# Patient Record
Sex: FEMALE | Race: WHITE | NOT HISPANIC OR LATINO | Employment: FULL TIME
[De-identification: names, ages, dates, MRNs, and addresses within clinical notes are randomized per-mention and may not be internally consistent; named-entity substitution may affect disease eponyms.]

---

## 2017-02-07 ENCOUNTER — RECORDS - HEALTHEAST (OUTPATIENT)
Dept: ADMINISTRATIVE | Facility: OTHER | Age: 37
End: 2017-02-07

## 2017-02-28 ENCOUNTER — OFFICE VISIT - HEALTHEAST (OUTPATIENT)
Dept: FAMILY MEDICINE | Facility: CLINIC | Age: 37
End: 2017-02-28

## 2017-02-28 DIAGNOSIS — J30.2 SEASONAL ALLERGIC RHINITIS, UNSPECIFIED ALLERGIC RHINITIS TRIGGER: ICD-10-CM

## 2017-02-28 DIAGNOSIS — L20.9 ATOPIC DERMATITIS, MILD: ICD-10-CM

## 2017-03-30 ENCOUNTER — COMMUNICATION - HEALTHEAST (OUTPATIENT)
Dept: FAMILY MEDICINE | Facility: CLINIC | Age: 37
End: 2017-03-30

## 2017-03-30 DIAGNOSIS — R21 RASH: ICD-10-CM

## 2017-04-25 ENCOUNTER — RECORDS - HEALTHEAST (OUTPATIENT)
Dept: ADMINISTRATIVE | Facility: OTHER | Age: 37
End: 2017-04-25

## 2018-01-11 ENCOUNTER — OFFICE VISIT - HEALTHEAST (OUTPATIENT)
Dept: FAMILY MEDICINE | Facility: CLINIC | Age: 38
End: 2018-01-11

## 2018-01-11 DIAGNOSIS — J01.90 ACUTE SINUSITIS, RECURRENCE NOT SPECIFIED, UNSPECIFIED LOCATION: ICD-10-CM

## 2018-02-22 ENCOUNTER — RECORDS - HEALTHEAST (OUTPATIENT)
Dept: ADMINISTRATIVE | Facility: OTHER | Age: 38
End: 2018-02-22

## 2018-06-07 ENCOUNTER — COMMUNICATION - HEALTHEAST (OUTPATIENT)
Dept: FAMILY MEDICINE | Facility: CLINIC | Age: 38
End: 2018-06-07

## 2018-06-07 ENCOUNTER — RECORDS - HEALTHEAST (OUTPATIENT)
Dept: GENERAL RADIOLOGY | Facility: CLINIC | Age: 38
End: 2018-06-07

## 2018-06-07 ENCOUNTER — OFFICE VISIT - HEALTHEAST (OUTPATIENT)
Dept: FAMILY MEDICINE | Facility: CLINIC | Age: 38
End: 2018-06-07

## 2018-06-07 DIAGNOSIS — M25.519 PAIN IN UNSPECIFIED SHOULDER: ICD-10-CM

## 2018-06-07 DIAGNOSIS — M25.512 PAIN IN LEFT SHOULDER: ICD-10-CM

## 2018-06-07 DIAGNOSIS — M25.512 LEFT SHOULDER PAIN: ICD-10-CM

## 2018-06-07 DIAGNOSIS — M25.519 AC JOINT PAIN: ICD-10-CM

## 2018-06-07 RX ORDER — LORATADINE 10 MG/1
10 TABLET ORAL DAILY
Status: SHIPPED | COMMUNITY
Start: 2018-06-07

## 2018-06-12 ENCOUNTER — COMMUNICATION - HEALTHEAST (OUTPATIENT)
Dept: FAMILY MEDICINE | Facility: CLINIC | Age: 38
End: 2018-06-12

## 2018-06-12 DIAGNOSIS — M76.60 ACHILLES TENDINITIS: ICD-10-CM

## 2018-06-12 DIAGNOSIS — M25.512 LEFT SHOULDER PAIN: ICD-10-CM

## 2018-06-14 ENCOUNTER — OFFICE VISIT - HEALTHEAST (OUTPATIENT)
Dept: PHYSICAL THERAPY | Facility: REHABILITATION | Age: 38
End: 2018-06-14

## 2018-06-14 DIAGNOSIS — R29.3 POOR POSTURE: ICD-10-CM

## 2018-06-14 DIAGNOSIS — M62.81 MUSCLE WEAKNESS (GENERALIZED): ICD-10-CM

## 2018-06-14 DIAGNOSIS — M25.512 ARTHRALGIA OF LEFT ACROMIOCLAVICULAR JOINT: ICD-10-CM

## 2018-08-31 ENCOUNTER — OFFICE VISIT - HEALTHEAST (OUTPATIENT)
Dept: FAMILY MEDICINE | Facility: CLINIC | Age: 38
End: 2018-08-31

## 2018-08-31 DIAGNOSIS — Z00.00 HEALTH MAINTENANCE EXAMINATION: ICD-10-CM

## 2018-08-31 ASSESSMENT — MIFFLIN-ST. JEOR: SCORE: 1550.18

## 2018-09-10 ENCOUNTER — AMBULATORY - HEALTHEAST (OUTPATIENT)
Dept: LAB | Facility: CLINIC | Age: 38
End: 2018-09-10

## 2018-09-10 DIAGNOSIS — Z00.00 HEALTH MAINTENANCE EXAMINATION: ICD-10-CM

## 2018-09-10 LAB
CHOLEST SERPL-MCNC: 180 MG/DL
FASTING STATUS PATIENT QL REPORTED: YES
FASTING STATUS PATIENT QL REPORTED: YES
GLUCOSE BLD-MCNC: 89 MG/DL (ref 70–99)
HDLC SERPL-MCNC: 59 MG/DL
LDLC SERPL CALC-MCNC: 107 MG/DL
TRIGL SERPL-MCNC: 72 MG/DL
TSH SERPL DL<=0.005 MIU/L-ACNC: 1.52 UIU/ML (ref 0.3–5)

## 2018-09-11 LAB
25(OH)D3 SERPL-MCNC: 27.6 NG/ML (ref 30–80)
25(OH)D3 SERPL-MCNC: 27.6 NG/ML (ref 30–80)

## 2020-07-08 ENCOUNTER — RECORDS - HEALTHEAST (OUTPATIENT)
Dept: GENERAL RADIOLOGY | Facility: CLINIC | Age: 40
End: 2020-07-08

## 2020-07-08 ENCOUNTER — OFFICE VISIT - HEALTHEAST (OUTPATIENT)
Dept: FAMILY MEDICINE | Facility: CLINIC | Age: 40
End: 2020-07-08

## 2020-07-08 DIAGNOSIS — Z97.5 PRESENCE OF (INTRAUTERINE) CONTRACEPTIVE DEVICE: ICD-10-CM

## 2020-07-08 DIAGNOSIS — M79.671 PAIN OF RIGHT HEEL: ICD-10-CM

## 2020-07-08 DIAGNOSIS — Z97.5 IUD (INTRAUTERINE DEVICE) IN PLACE: ICD-10-CM

## 2020-07-08 ASSESSMENT — MIFFLIN-ST. JEOR: SCORE: 1569.47

## 2020-11-02 ENCOUNTER — OFFICE VISIT - HEALTHEAST (OUTPATIENT)
Dept: FAMILY MEDICINE | Facility: CLINIC | Age: 40
End: 2020-11-02

## 2020-11-02 DIAGNOSIS — M70.62 TROCHANTERIC BURSITIS OF BOTH HIPS: ICD-10-CM

## 2020-11-02 DIAGNOSIS — M70.61 TROCHANTERIC BURSITIS OF BOTH HIPS: ICD-10-CM

## 2020-11-08 ENCOUNTER — RECORDS - HEALTHEAST (OUTPATIENT)
Dept: ADMINISTRATIVE | Facility: OTHER | Age: 40
End: 2020-11-08

## 2020-11-24 ENCOUNTER — OFFICE VISIT - HEALTHEAST (OUTPATIENT)
Dept: FAMILY MEDICINE | Facility: CLINIC | Age: 40
End: 2020-11-24

## 2020-11-24 DIAGNOSIS — Z12.11 SCREEN FOR COLON CANCER: ICD-10-CM

## 2020-11-24 DIAGNOSIS — R59.1 LYMPHADENOPATHY: ICD-10-CM

## 2020-12-15 ENCOUNTER — OFFICE VISIT - HEALTHEAST (OUTPATIENT)
Dept: FAMILY MEDICINE | Facility: CLINIC | Age: 40
End: 2020-12-15

## 2020-12-15 DIAGNOSIS — N63.10 MASSES OF BOTH BREASTS: ICD-10-CM

## 2020-12-15 DIAGNOSIS — R59.0 AXILLARY LYMPHADENOPATHY: ICD-10-CM

## 2020-12-15 DIAGNOSIS — N63.20 MASSES OF BOTH BREASTS: ICD-10-CM

## 2020-12-28 ENCOUNTER — HOSPITAL ENCOUNTER (OUTPATIENT)
Dept: MAMMOGRAPHY | Facility: CLINIC | Age: 40
Discharge: HOME | End: 2020-12-28
Attending: FAMILY MEDICINE

## 2020-12-28 DIAGNOSIS — N63.20 MASSES OF BOTH BREASTS: ICD-10-CM

## 2020-12-28 DIAGNOSIS — R59.0 AXILLARY LYMPHADENOPATHY: ICD-10-CM

## 2020-12-28 DIAGNOSIS — N63.10 MASSES OF BOTH BREASTS: ICD-10-CM

## 2020-12-28 DIAGNOSIS — N63.20 LEFT BREAST LUMP: ICD-10-CM

## 2021-01-24 ENCOUNTER — COMMUNICATION - HEALTHEAST (OUTPATIENT)
Dept: FAMILY MEDICINE | Facility: CLINIC | Age: 41
End: 2021-01-24

## 2021-02-05 ENCOUNTER — COMMUNICATION - HEALTHEAST (OUTPATIENT)
Dept: FAMILY MEDICINE | Facility: CLINIC | Age: 41
End: 2021-02-05

## 2021-02-05 DIAGNOSIS — M70.61 TROCHANTERIC BURSITIS OF BOTH HIPS: ICD-10-CM

## 2021-02-05 DIAGNOSIS — M70.62 TROCHANTERIC BURSITIS OF BOTH HIPS: ICD-10-CM

## 2021-02-05 RX ORDER — NAPROXEN 500 MG/1
TABLET ORAL
Qty: 60 TABLET | Refills: 2 | Status: SHIPPED | OUTPATIENT
Start: 2021-02-05 | End: 2022-03-08

## 2021-02-15 ENCOUNTER — OFFICE VISIT - HEALTHEAST (OUTPATIENT)
Dept: FAMILY MEDICINE | Facility: CLINIC | Age: 41
End: 2021-02-15

## 2021-02-15 DIAGNOSIS — Z11.59 ENCOUNTER FOR HEPATITIS C SCREENING TEST FOR LOW RISK PATIENT: ICD-10-CM

## 2021-02-15 DIAGNOSIS — M79.671 PAIN OF RIGHT HEEL: ICD-10-CM

## 2021-02-15 DIAGNOSIS — Z12.4 SCREENING FOR CERVICAL CANCER: ICD-10-CM

## 2021-02-15 DIAGNOSIS — Z80.0 FAMILY HISTORY OF COLON CANCER: ICD-10-CM

## 2021-02-15 DIAGNOSIS — Z97.5 IUD (INTRAUTERINE DEVICE) IN PLACE: ICD-10-CM

## 2021-02-15 DIAGNOSIS — Z00.01 ENCOUNTER FOR GENERAL ADULT MEDICAL EXAMINATION WITH ABNORMAL FINDINGS: ICD-10-CM

## 2021-02-15 DIAGNOSIS — Z88.9: ICD-10-CM

## 2021-02-15 DIAGNOSIS — Z13.220 LIPID SCREENING: ICD-10-CM

## 2021-02-15 DIAGNOSIS — Z80.3 FAMILY HISTORY OF MALIGNANT NEOPLASM OF BREAST: ICD-10-CM

## 2021-02-15 DIAGNOSIS — Z12.11 SCREEN FOR COLON CANCER: ICD-10-CM

## 2021-02-15 DIAGNOSIS — Z83.49 FAMILY HISTORY OF THYROID DISEASE: ICD-10-CM

## 2021-02-15 LAB
ALBUMIN SERPL-MCNC: 3.9 G/DL (ref 3.5–5)
ALP SERPL-CCNC: 58 U/L (ref 45–120)
ALT SERPL W P-5'-P-CCNC: 13 U/L (ref 0–45)
ANION GAP SERPL CALCULATED.3IONS-SCNC: 9 MMOL/L (ref 5–18)
AST SERPL W P-5'-P-CCNC: 14 U/L (ref 0–40)
BILIRUB SERPL-MCNC: 0.6 MG/DL (ref 0–1)
BUN SERPL-MCNC: 15 MG/DL (ref 8–22)
CALCIUM SERPL-MCNC: 9 MG/DL (ref 8.5–10.5)
CHLORIDE BLD-SCNC: 106 MMOL/L (ref 98–107)
CHOLEST SERPL-MCNC: 181 MG/DL
CO2 SERPL-SCNC: 23 MMOL/L (ref 22–31)
CREAT SERPL-MCNC: 0.77 MG/DL (ref 0.6–1.1)
ERYTHROCYTE [DISTWIDTH] IN BLOOD BY AUTOMATED COUNT: 12.5 % (ref 11–14.5)
FASTING STATUS PATIENT QL REPORTED: YES
GFR SERPL CREATININE-BSD FRML MDRD: >60 ML/MIN/1.73M2
GLUCOSE BLD-MCNC: 86 MG/DL (ref 70–125)
HCT VFR BLD AUTO: 43.3 % (ref 35–47)
HDLC SERPL-MCNC: 62 MG/DL
HGB BLD-MCNC: 14.6 G/DL (ref 12–16)
LDLC SERPL CALC-MCNC: 104 MG/DL
MCH RBC QN AUTO: 29.4 PG (ref 27–34)
MCHC RBC AUTO-ENTMCNC: 33.7 G/DL (ref 32–36)
MCV RBC AUTO: 87 FL (ref 80–100)
PLATELET # BLD AUTO: 276 THOU/UL (ref 140–440)
PMV BLD AUTO: 8.5 FL (ref 7–10)
POTASSIUM BLD-SCNC: 4.3 MMOL/L (ref 3.5–5)
PROT SERPL-MCNC: 6.6 G/DL (ref 6–8)
RBC # BLD AUTO: 4.96 MILL/UL (ref 3.8–5.4)
SODIUM SERPL-SCNC: 138 MMOL/L (ref 136–145)
TRIGL SERPL-MCNC: 75 MG/DL
TSH SERPL DL<=0.005 MIU/L-ACNC: 1.15 UIU/ML (ref 0.3–5)
WBC: 5.8 THOU/UL (ref 4–11)

## 2021-02-15 RX ORDER — MONTELUKAST SODIUM 10 MG/1
10 TABLET ORAL DAILY
Qty: 30 TABLET | Refills: 11 | Status: SHIPPED | OUTPATIENT
Start: 2021-02-15 | End: 2021-09-21

## 2021-02-15 ASSESSMENT — MIFFLIN-ST. JEOR: SCORE: 1589.42

## 2021-02-16 LAB
HCV AB SERPL QL IA: NEGATIVE
HPV SOURCE: NORMAL
HUMAN PAPILLOMA VIRUS 16 DNA: NEGATIVE
HUMAN PAPILLOMA VIRUS 18 DNA: NEGATIVE
HUMAN PAPILLOMA VIRUS FINAL DIAGNOSIS: NORMAL
HUMAN PAPILLOMA VIRUS OTHER HR: NEGATIVE
SPECIMEN DESCRIPTION: NORMAL

## 2021-02-22 LAB
BKR LAB AP ABNORMAL BLEEDING: NO
BKR LAB AP BIRTH CONTROL/HORMONES: NORMAL
BKR LAB AP CERVICAL APPEARANCE: NORMAL
BKR LAB AP GYN ADEQUACY: NORMAL
BKR LAB AP GYN INTERPRETATION: NORMAL
BKR LAB AP HPV REFLEX: NORMAL
BKR LAB AP LMP: NORMAL
BKR LAB AP PATIENT STATUS: NORMAL
BKR LAB AP PREVIOUS ABNORMAL: NORMAL
BKR LAB AP PREVIOUS NORMAL: NORMAL
HIGH RISK?: YES
PATH REPORT.COMMENTS IMP SPEC: NORMAL
RESULT FLAG (HE HISTORICAL CONVERSION): NORMAL

## 2021-03-16 ENCOUNTER — OFFICE VISIT - HEALTHEAST (OUTPATIENT)
Dept: ALLERGY | Facility: CLINIC | Age: 41
End: 2021-03-16

## 2021-03-16 DIAGNOSIS — T78.2XXD ANAPHYLAXIS, SUBSEQUENT ENCOUNTER: ICD-10-CM

## 2021-03-16 DIAGNOSIS — L50.1 CHRONIC IDIOPATHIC URTICARIA: ICD-10-CM

## 2021-03-16 DIAGNOSIS — J30.1 SEASONAL ALLERGIC RHINITIS DUE TO POLLEN: ICD-10-CM

## 2021-03-16 DIAGNOSIS — L50.3 DERMATOGRAPHIA: ICD-10-CM

## 2021-03-16 LAB
ALT SERPL W P-5'-P-CCNC: 14 U/L (ref 0–45)
AST SERPL W P-5'-P-CCNC: 14 U/L (ref 0–40)
BASOPHILS # BLD AUTO: 0 THOU/UL (ref 0–0.2)
BASOPHILS NFR BLD AUTO: 0 % (ref 0–2)
CREAT SERPL-MCNC: 0.77 MG/DL (ref 0.6–1.1)
EOSINOPHIL # BLD AUTO: 0.3 THOU/UL (ref 0–0.4)
EOSINOPHIL NFR BLD AUTO: 3 % (ref 0–6)
ERYTHROCYTE [DISTWIDTH] IN BLOOD BY AUTOMATED COUNT: 13 % (ref 11–14.5)
GFR SERPL CREATININE-BSD FRML MDRD: >60 ML/MIN/1.73M2
HCT VFR BLD AUTO: 41.7 % (ref 35–47)
HGB BLD-MCNC: 14.1 G/DL (ref 12–16)
IMM GRANULOCYTES # BLD: 0 THOU/UL
IMM GRANULOCYTES NFR BLD: 0 %
LYMPHOCYTES # BLD AUTO: 2.7 THOU/UL (ref 0.8–4.4)
LYMPHOCYTES NFR BLD AUTO: 29 % (ref 20–40)
MCH RBC QN AUTO: 29.4 PG (ref 27–34)
MCHC RBC AUTO-ENTMCNC: 33.8 G/DL (ref 32–36)
MCV RBC AUTO: 87 FL (ref 80–100)
MONOCYTES # BLD AUTO: 0.7 THOU/UL (ref 0–0.9)
MONOCYTES NFR BLD AUTO: 7 % (ref 2–10)
NEUTROPHILS # BLD AUTO: 5.4 THOU/UL (ref 2–7.7)
NEUTROPHILS NFR BLD AUTO: 60 % (ref 50–70)
PLATELET # BLD AUTO: 305 THOU/UL (ref 140–440)
PMV BLD AUTO: 8.7 FL (ref 7–10)
RBC # BLD AUTO: 4.8 MILL/UL (ref 3.8–5.4)
TSH SERPL DL<=0.005 MIU/L-ACNC: 1.4 UIU/ML (ref 0.3–5)
WBC: 9.1 THOU/UL (ref 4–11)

## 2021-03-16 RX ORDER — EPINEPHRINE 0.3 MG/.3ML
0.3 INJECTION SUBCUTANEOUS PRN
Qty: 4 | Refills: 0 | Status: SHIPPED | OUTPATIENT
Start: 2021-03-16 | End: 2024-05-21

## 2021-03-16 ASSESSMENT — MIFFLIN-ST. JEOR: SCORE: 1587.61

## 2021-03-17 ENCOUNTER — COMMUNICATION - HEALTHEAST (OUTPATIENT)
Dept: ALLERGY | Facility: CLINIC | Age: 41
End: 2021-03-17

## 2021-03-17 LAB
25(OH)D3 SERPL-MCNC: 39.1 NG/ML (ref 30–80)
A ALTERNATA IGE QN: 2.01 KU/L
A FUMIGATUS IGE QN: <0.35 KU/L
BERMUDA GRASS IGE QN: <0.35 KU/L
C HERBARUM IGE QN: <0.35 KU/L
CAT DANDER IGG QN: <0.35 KU/L
CEDAR IGE QN: <0.35 KU/L
COMMON RAGWEED IGE QN: 23.2 KU/L
COTTONWOOD IGE QN: <0.35 KU/L
D FARINAE IGE QN: <0.35 KU/L
D PTERONYSS IGE QN: <0.35 KU/L
DOG DANDER+EPITH IGE QN: <0.35 KU/L
MAPLE IGE QN: <0.35 KU/L
MARSH ELDER IGE QN: 1.24 KU/L
MOUSE URINE PROT IGE QN: <0.35 KU/L
NETTLE IGE QN: <0.35 KU/L
P NOTATUM IGE QN: <0.35 KU/L
ROACH IGE QN: <0.35 KU/L
SALTWORT IGE QN: <0.35 KU/L
SILVER BIRCH IGE QN: <0.35 KU/L
TIMOTHY IGE QN: <0.35 KU/L
TOTAL IGE - HISTORICAL: 122 KU/L (ref 0–100)
WHITE ASH IGE QN: <0.35 KU/L
WHITE ELM IGE QN: <0.35 KU/L
WHITE MULBERRY IGE QN: <0.35 KU/L
WHITE OAK IGE QN: <0.35 KU/L

## 2021-03-18 ENCOUNTER — AMBULATORY - HEALTHEAST (OUTPATIENT)
Dept: LAB | Facility: CLINIC | Age: 41
End: 2021-03-18

## 2021-03-18 ENCOUNTER — COMMUNICATION - HEALTHEAST (OUTPATIENT)
Dept: ALLERGY | Facility: CLINIC | Age: 41
End: 2021-03-18

## 2021-03-18 DIAGNOSIS — T78.2XXD ANAPHYLAXIS, SUBSEQUENT ENCOUNTER: ICD-10-CM

## 2021-03-19 ENCOUNTER — COMMUNICATION - HEALTHEAST (OUTPATIENT)
Dept: ALLERGY | Facility: CLINIC | Age: 41
End: 2021-03-19

## 2021-03-19 LAB
ANA SER QL: 0.1 U
TRYPTASE SERPL-MCNC: 2.7 UG/L

## 2021-03-25 LAB
MISCELLANEOUS TEST DEPT. - HE HISTORICAL: NORMAL
PERFORMING LAB: NORMAL
SPECIMEN STATUS: NORMAL
TEST NAME: NORMAL

## 2021-04-05 ENCOUNTER — COMMUNICATION - HEALTHEAST (OUTPATIENT)
Dept: ALLERGY | Facility: CLINIC | Age: 41
End: 2021-04-05

## 2021-04-08 ENCOUNTER — AMBULATORY - HEALTHEAST (OUTPATIENT)
Dept: NURSING | Facility: CLINIC | Age: 41
End: 2021-04-08

## 2021-04-26 ENCOUNTER — RECORDS - HEALTHEAST (OUTPATIENT)
Dept: ADMINISTRATIVE | Facility: OTHER | Age: 41
End: 2021-04-26

## 2021-04-29 ENCOUNTER — AMBULATORY - HEALTHEAST (OUTPATIENT)
Dept: NURSING | Facility: CLINIC | Age: 41
End: 2021-04-29

## 2021-05-30 VITALS — WEIGHT: 192.31 LBS | BODY MASS INDEX: 33.01 KG/M2

## 2021-05-31 VITALS — BODY MASS INDEX: 34.41 KG/M2 | WEIGHT: 200.44 LBS

## 2021-06-01 VITALS — BODY MASS INDEX: 33.51 KG/M2 | WEIGHT: 196.25 LBS | HEIGHT: 64 IN

## 2021-06-01 VITALS — WEIGHT: 195.5 LBS | BODY MASS INDEX: 33.56 KG/M2

## 2021-06-04 VITALS
WEIGHT: 197 LBS | DIASTOLIC BLOOD PRESSURE: 78 MMHG | OXYGEN SATURATION: 100 % | BODY MASS INDEX: 32.82 KG/M2 | SYSTOLIC BLOOD PRESSURE: 118 MMHG | HEIGHT: 65 IN | HEART RATE: 88 BPM

## 2021-06-05 VITALS
SYSTOLIC BLOOD PRESSURE: 112 MMHG | DIASTOLIC BLOOD PRESSURE: 73 MMHG | BODY MASS INDEX: 32.92 KG/M2 | RESPIRATION RATE: 18 BRPM | WEIGHT: 197.8 LBS | TEMPERATURE: 98.1 F | HEART RATE: 83 BPM

## 2021-06-05 VITALS
OXYGEN SATURATION: 99 % | TEMPERATURE: 98.1 F | WEIGHT: 201.4 LBS | DIASTOLIC BLOOD PRESSURE: 72 MMHG | HEIGHT: 65 IN | BODY MASS INDEX: 33.55 KG/M2 | SYSTOLIC BLOOD PRESSURE: 111 MMHG | RESPIRATION RATE: 20 BRPM | HEART RATE: 75 BPM

## 2021-06-05 VITALS — BODY MASS INDEX: 33.49 KG/M2 | HEART RATE: 74 BPM | OXYGEN SATURATION: 99 % | WEIGHT: 201 LBS | HEIGHT: 65 IN

## 2021-06-05 VITALS
BODY MASS INDEX: 33.02 KG/M2 | DIASTOLIC BLOOD PRESSURE: 73 MMHG | OXYGEN SATURATION: 99 % | HEART RATE: 92 BPM | TEMPERATURE: 98.1 F | WEIGHT: 198.4 LBS | RESPIRATION RATE: 18 BRPM | SYSTOLIC BLOOD PRESSURE: 111 MMHG

## 2021-06-05 VITALS
DIASTOLIC BLOOD PRESSURE: 64 MMHG | SYSTOLIC BLOOD PRESSURE: 100 MMHG | OXYGEN SATURATION: 99 % | BODY MASS INDEX: 32.95 KG/M2 | WEIGHT: 198 LBS | HEART RATE: 85 BPM

## 2021-06-09 NOTE — PROGRESS NOTES
Assessment & Plan:  1. Atopic dermatitis, mild  Differential diagnosis would include eczema, other allergic atopic dermatitis, ringworm.  Given recent fungal treatment decided against the KOH of the skin today.  If unsuccessful with triamcinolone cream we could pursue skin scraping and see what shows up.  Will follow with the patient and watch for improvement.  For now try cream as below for 2 weeks.  - triamcinolone (KENALOG) 0.1 % cream; Apply daily to affected area for up to 14 days.  Dispense: 30 g; Refill: 0    2. Allergic rhinitis    Patient Instructions   Steroid cream to rash once daily for up to 14 days.  Discussed properties of steroid cream and possibility of skin thinning.  Patient to return to the clinic if symptoms are not improving after 2 weeks of cream.    Sinus symptoms seem consistent with allergic rhinitis.  Discussed that this is probably due to weather change.  Patient may use over-the-counter allergy remedies such as Allegra, or Claritin as needed.  She may also use Flonase as needed for better control of symptoms.  Return if increase in sinus pressure or drainage or developing a fever.      No orders of the defined types were placed in this encounter.    There are no discontinued medications.        Chief Complaint:   Chief Complaint   Patient presents with     Rash     rash on left forearm     Nasal Congestion     chronic congestion, pt would like her ears checked        History of Present Illness:  Jeimy is a 36 y.o. female presenting to the clinic today to discuss a rash on the left forearm.  Rest has been present for approximately 2 months.  She has been trying various over-the-counter treatments to treat it without success.  Rash is pink in appearance, round with no distinct raised edge or flaking.  We discussed the course of the rash has not changed, and no point has it been more dry or flaky.  Initially she treated it with over-the-counter hydrocortisone cream without relief as a next  step she tried fungal cream without relief.  She has been using the antifungals up until today without improvement.  It is slightly itchy, but otherwise does not bother her.  She has noticed increase in sinus symptoms as well with nasal congestion for the past couple of months and worsening over the past week or so.  Does feel quite full and are occasionally painful and she would like them looked at today.  She does not use anything for allergies.  No recent fever or chills, no other rash besides the localized area..     Review of Systems:  All other systems are negative except as noted above.    PFS:  Reviewed and updated.    Tobacco Use:  History   Smoking Status     Never Smoker   Smokeless Tobacco     Not on file       Vitals:  Vitals:    02/28/17 1418   BP: 106/70   Patient Site: Left Arm   Patient Position: Sitting   Cuff Size: Adult Large   Resp: 14   Temp: 99.4  F (37.4  C)   TempSrc: Oral   Weight: 192 lb 5 oz (87.2 kg)     Wt Readings from Last 3 Encounters:   02/28/17 192 lb 5 oz (87.2 kg)   12/02/16 192 lb (87.1 kg)       Physical Exam:  Constitutional:  Reveals an alert, cooperative, 36 year old female in no acute distress.  Vitals:  Per nursing notes.  Eyes: PERRLA  HENT: TMs clear bilaterally with slight effusions bilaterally,Oropharynx without erythema, posterior nasal drainage or thrush. Neck supple, no lymphadenopathy,  thyroid not palpable. Nasal mucosa boggy, pink.  Cardiovascular:  Regular rate and rhythm without murmurs, rubs, or gallops. Carotids without bruits. Legs without edema.   Respiratory: Clear.  Respiratory effort normal.  Skin:   Inner left forearm with round area of discoloration, pink. Not raised, red or swollen. Not warm to touch. No raised border or ring like appearance currently.     Data Reviewed:  Additional History from Old Records or Another Person Summarized (2 total): None.     Decision to Obtain Extra information (1 total): None.     Radiology Tests Summarized and  Ordered (XRAY/CT/MRI/DXA) (1 total): None.    Labs Reviewed and Ordered (1 total): None.    Medicine Tests Summarized and Ordered (EKG/ECHO/COLONOSCOPY/EGD) (1 total): None.    Independent Review of EKG or X-Ray (2 each): None.    The visit lasted a total of 20 minutes face to face with the patient. Over 50% of the time was spent counseling and educating the patient about plan of care.    Medications:  Current Outpatient Prescriptions   Medication Sig Dispense Refill     levonorgestrel (MIRENA) 20 mcg/24 hr (5 years) IUD 1 each by Intrauterine route once.       triamcinolone (KENALOG) 0.1 % cream Apply daily to affected area for up to 14 days. 30 g 0     No current facility-administered medications for this visit.        Total Data Points: 0    USMAN Haque, CNP    This note has been dictated using voice recognition software. Any grammatical or context distortions are unintentional and inherent to the software

## 2021-06-09 NOTE — PROGRESS NOTES
ASSESSMENT AND PLAN:     Problem List Items Addressed This Visit        Unprioritized    IUD (intrauterine device) in place    Relevant Orders    XR Calcaneus Right 2 or More Views    Pain of right heel - Primary     Heel pain after prolonged hiking. Suspect soft tissue inflamation of achilles, plantar fascia or both.     Recommend Apply a compressive ACE bandage. Rest and elevate the affected painful area.  Apply cold compresses intermittently as needed.  As pain recedes, begin normal activities slowly as tolerated.  Call if symptoms persist.     See avs for more details.                 Chief Complaint   Patient presents with     Foot Problem     Started Friday after a hard steep hike on Thursday.  Sharp pain in heel developed afterwards. Now has some throbbing in it.         HPI  Jeimy Lewis is a 40 y.o. female who is new to me today and usually sees Dr. Duncan.  pmh is significant only for for allergic rhinitis.    Comes in today for pain in her foot after hiking a hard steep incline Thursday. She notes sharp throbbing pain in heel.     She says the pain was bad Friday morning but has abated since then but is not resolving.    Social History     Tobacco Use   Smoking Status Never Smoker   Smokeless Tobacco Never Used      Current Outpatient Medications on File Prior to Visit   Medication Sig Dispense Refill     fluticasone (FLONASE) 50 mcg/actuation nasal spray Apply 1 spray into each nostril daily.       levonorgestrel (MIRENA) 20 mcg/24 hr (5 years) IUD 1 each by Intrauterine route once.       loratadine (CLARITIN) 10 mg tablet Take 10 mg by mouth daily.       No current facility-administered medications on file prior to visit.       No Known Allergies      Review of Systems   Constitutional: Negative.    HENT: Negative.    Eyes: Negative.    Respiratory: Negative.    Cardiovascular: Negative.    Gastrointestinal: Negative.    Endocrine: Negative.    Genitourinary: Negative.    Musculoskeletal: Negative.   "  Skin: Negative.    Neurological: Negative.    Hematological: Negative.    Psychiatric/Behavioral: Negative.         OBJECTIVE: /78   Pulse 88   Ht 5' 5\" (1.651 m)   Wt 197 lb (89.4 kg)   LMP  (LMP Unknown)   SpO2 100%   Breastfeeding No   BMI 32.78 kg/m     Physical Exam  Constitutional:       General: She is not in acute distress.     Appearance: She is well-developed.   HENT:      Head: Normocephalic and atraumatic.   Eyes:      Conjunctiva/sclera: Conjunctivae normal.   Neck:      Musculoskeletal: Neck supple.   Pulmonary:      Effort: Pulmonary effort is normal.   Musculoskeletal: Normal range of motion.      Right foot: Normal range of motion and normal capillary refill. No tenderness, bony tenderness, swelling, crepitus, deformity or laceration.      Comments: Some mild tenderness with compression of the right calcaneous.   No pain with great toe flexion of the plantar fascia.   Achilles seems intact and not tender.    Skin:     General: Skin is warm and dry.   Neurological:      Mental Status: She is alert and oriented to person, place, and time.        xr right calcaneous - personally reviewed and appears normal. Await rads reading.     Additional History from Old Records Summarized (2): yes  Decision to Obtain Records (1): no  Radiology Tests Summarized or Ordered (1): yes  Labs Reviewed or Ordered (1): no  Medicine Test Summarized or Ordered (1): no  Independent Review of EKG or X-RAY(2 each): yes    This note was created using Dragon dictation.  Please excuse any grammatical errors.   "

## 2021-06-12 ENCOUNTER — HEALTH MAINTENANCE LETTER (OUTPATIENT)
Age: 41
End: 2021-06-12

## 2021-06-12 NOTE — PROGRESS NOTES
ASSESSMENT and PLAN:  1. Trochanteric bursitis of both hips  -We discussed that her symptoms are most consistent with trochanteric bursitis and there were some stretches that I demonstrated for her today but I also referred her to watching YouTube videos for doing stretches with a physical therapist.  Continue to ice and may use naproxen which may be will be more effective for her if she takes in the evening before bed as it is longer acting.  We discussed that there could be some radicular symptoms coming from her back and I would suggest for starting with a different type of physical therapy then she has done in the past and suggested motion care if it is covered by her insurance.  She is going to look into this and call to schedule an appointment to see if she gets improvement with adjustments and if there is no improvement then I would suggest doing formal physical therapy with stretching and strengthening.  She will let me know if things are not getting better.  I did not think she needed x-rays today since she has good range of motion of the hips.  I did also mention to her that a steroid injection could be helpful for her pain but she declined at this time  - naproxen (NAPROSYN) 500 MG tablet; Take 1 tablet (500 mg total) by mouth 2 (two) times a day as needed (for pain).  Dispense: 60 tablet; Refill: 2  - Ambulatory referral to PT/OT    She has not yet established care with a new provider      There are no Patient Instructions on file for this visit.    Orders Placed This Encounter   Procedures     Ambulatory referral to PT/OT     Referral Priority:   Routine     Referral Type:   Physical Therapy or Occupational Therapy     Referral Reason:   Evaluation and Treatment     Requested Specialty:   Physical Therapy     Number of Visits Requested:   1     There are no discontinued medications.    No follow-ups on file.    DATA REVIEWED:      The visit lasted a total of 20 minutes face to face with the patient.  Over 50% of the time was spent counseling and educating the patient     CHIEF COMPLAINT:  Chief Complaint   Patient presents with     Hip Pain     Hip pain for 10 years.  Has been getting worse lately.         HISTORY OF PRESENT ILLNESS:  Jeimy Lewis is a 40 y.o. female  who presents today for complaints of Right hip pain 10 years. Worse if not as active. Is fairly Stationary. Working Couple days a week work from home. At office has standing desk   Started around the time of her last pregnancy. Has 2 kids. Oldest 9. Had bad sciatica right with pregnancy. Did PT back then and had went to chiropractic care when younger.   Did volleyball when younger but wasn't an extreme athlete. Does not recall any specific injury.   Takes ibuprofen 2-4. Will stretch and get up walk around. At  Night has to constantly Change positions. Flop around at night. Cant fall asleep unless lay flat. Not icing.  Painful first get out of bed in morning . Flare if been hiking. Calms down as day goes on.  Pain can be around a 5 out of 10 and is nonradiating    REVIEW OF SYSTEMS:    Comprehensive review of systems is negative except as noted in the HPI.     PFSH:  Tobacco use and medications reviewed below.     TOBACCO USE:  Social History     Tobacco Use   Smoking Status Never Smoker   Smokeless Tobacco Never Used       VITALS:  Vitals:    11/02/20 1655   BP: 112/73   Pulse: 83   Resp: 18   Temp: 98.1  F (36.7  C)   TempSrc: Oral   Weight: 197 lb 12.8 oz (89.7 kg)     Wt Readings from Last 3 Encounters:   11/02/20 197 lb 12.8 oz (89.7 kg)   07/08/20 197 lb (89.4 kg)   08/31/18 196 lb 4 oz (89 kg)       PHYSICAL EXAM:  Constitutional:  Reveals a 40 y.o. female in NAD.  Vitals:  Per nursing notes.  Neck:  Supple, thyroid not palpable.  Cardiac:  Regular rate and rhythm without murmurs, rubs, or gallops. Carotids without bruits. Legs without edema. Palpation of the radial pulse regular.  Lungs: Clear.  Respiratory effort normal.  Skin:   Without  rash, bruise, or palpable lesions.  Rheumatologic: Normal joints and nails of the hands.  Neurologic:  Cranial nerves II-XII intact.   2+ patellar reflexes bilateral.  Psychiatric:  Mood appropriate, memory intact.   Musculoskeletal.  Patient has full range of motion of both hips in flexion and external rotation and internal rotation.  Negative Laura.  5 out of 5 muscle strength testing of the hips in flexion abduction and abduction.  She has extreme pain to palpation along greater trochanters bilateral but worse on the right.  Negative straight leg raise.      MEDICATIONS:  Current Outpatient Medications   Medication Sig Dispense Refill     fluticasone (FLONASE) 50 mcg/actuation nasal spray Apply 1 spray into each nostril daily.       levonorgestrel (MIRENA) 20 mcg/24 hr (5 years) IUD 1 each by Intrauterine route once.       loratadine (CLARITIN) 10 mg tablet Take 10 mg by mouth daily.       naproxen (NAPROSYN) 500 MG tablet Take 1 tablet (500 mg total) by mouth 2 (two) times a day as needed (for pain). 60 tablet 2     No current facility-administered medications for this visit.

## 2021-06-13 NOTE — PROGRESS NOTES
ASSESSMENT and PLAN:  Jeimy was seen today for mass.    Diagnoses and all orders for this visit:    Axillary lymphadenopathy  -     Mammo Diagnostic Bilateral; Future  -     US Breast Bilateral Limited 1-3 Quadrants; Future    Masses of both breasts  -     Mammo Diagnostic Bilateral; Future  -     US Breast Bilateral Limited 1-3 Quadrants; Future          Patient Instructions    Schedule ultrasound and mammogram [phone]      Orders Placed This Encounter   Procedures     Mammo Diagnostic Bilateral     Standing Status:   Future     Standing Expiration Date:   12/15/2021     Order Specific Question:   Is tomosynthesis (3D) requested?     Answer:   Yes     Order Specific Question:   Reason for Exam (Describe Symptoms):     Answer:   palpable lymph node in left breast and larger in axilla space, painful glandular cyst base bilateral breast, maternal hx breast cancer     Order Specific Question:   Is the patient pregnant?     Answer:   No     Order Specific Question:   Can the procedure be changed per Radiologist protocol?     Answer:   Yes     US Breast Bilateral Limited 1-3 Quadrants     Standing Status:   Future     Standing Expiration Date:   12/15/2021     Order Specific Question:   Reason for Exam (Describe Symptoms):     Answer:   palpable lymph node in left breast and larger in axilla space, painful glandular cyst base bilateral breast, maternal hx breast cancer     Order Specific Question:   Is the patient pregnant?     Answer:   No     Order Specific Question:   Can the procedure be changed per Radiologist protocol?     Answer:   Yes     Order Specific Question:   Is this breast exam a follow up from a Breast MRI exam that was performed in the last 6 months?     Answer:   No     There are no discontinued medications.    No follow-ups on file.    DATA REVIEWED:  Additional History from Old Records Summarized (2): Reviewed recent office visit Dr. Henry regarding breast mass  Decision to Obtain Records (1):     Radiology Tests Summarized or Ordered (1): Ultrasound and mammogram ordered   Labs Reviewed or Ordered (1):    Medicine Test Summarized or Ordered (1):    Independent Review of EKG, X-RAY, or RAPID STREP (2 each):      The visit lasted a total of 15 minutes face to face with the patient. Over 50% of the time was spent counseling and educating the patient     CHIEF COMPLAINT:  Chief Complaint   Patient presents with     Mass     Saw Dr. Rhodes, waited 2 wks as advised.  Then did 5 day course of antibiotics and no change in lump at all.  Left axillia area.  Quarter size.  Red, slightly painful, no warmth.         HISTORY OF PRESENT ILLNESS:  Jeimy Lewis is a 40 y.o. female presents today to follow-up on mass in left axillary space.  She decays a started approximately 5 weeks ago and felt like a lymph node.  Tender only if touches it.  She shaves every other day has not seen any skin changes on the outside.  She has changed her razors and uses secret deodorant which is what she has always used.  She has felt no breast lumps or changes.  No nipple changes or discharge.  She does have a family history of breast cancer in her mother which was found on mammogram in her mid 50s when she was diagnosed.  She also is overdue for colonoscopy given family history.  She plans to see me this winter for a physical and she plans to establish care with me.  Her hip issues have greatly improved since she started being seen at [facility] and is seeing [facility]     REVIEW OF SYSTEMS:    Comprehensive review of systems is negative except as noted in the HPI.     PFSH:  Tobacco use and medications reviewed below.     TOBACCO USE:  Social History     Tobacco Use   Smoking Status Never Smoker   Smokeless Tobacco Never Used       VITALS:  Vitals:    12/15/20 1339   BP: 111/73   Pulse: 92   Resp: 18   Temp: 98.1  F (36.7  C)   TempSrc: Oral   SpO2: 99%   Weight: 198 lb 6.4 oz (90 kg)     Wt Readings from Last 3 Encounters:   12/15/20 198 lb  6.4 oz (90 kg)   11/24/20 198 lb (89.8 kg)   11/02/20 197 lb 12.8 oz (89.7 kg)       PHYSICAL EXAM:  Constitutional:  Reveals a 40 y.o. female in NAD.  Vitals:  Per nursing notes.  Neck:  Supple, thyroid not palpable.  Cardiac:  Regular rate and rhythm without murmurs, rubs, or gallops. Carotids without bruits. Legs without edema. Palpation of the radial pulse regular.  Lungs: Clear.  Respiratory effort normal.  Skin:   Without rash, bruise, or palpable lesions.  Rheumatologic: Normal joints and nails of the hands.  Neurologic:  Cranial nerves II-XII intact.     Psychiatric:  Mood appropriate, memory intact.   Breast exam: Normal symmetry no external dimpling or inversion of the nipple.  There is glandular cysts base of bilateral breasts that is tender to palpation.  Approximately 2 cm palpable mass in the left axillary space feels consistent with a lymph node.  No skin changes      MEDICATIONS:  Current Outpatient Medications   Medication Sig Dispense Refill     fluticasone (FLONASE) 50 mcg/actuation nasal spray Apply 1 spray into each nostril daily.       levonorgestrel (MIRENA) 20 mcg/24 hr (5 years) IUD 1 each by Intrauterine route once.       loratadine (CLARITIN) 10 mg tablet Take 10 mg by mouth daily.       naproxen (NAPROSYN) 500 MG tablet Take 1 tablet (500 mg total) by mouth 2 (two) times a day as needed (for pain). 60 tablet 2     No current facility-administered medications for this visit.

## 2021-06-13 NOTE — PROGRESS NOTES
ASSESSMENT AND PLAN:     Problem List Items Addressed This Visit        Unprioritized    Lymphadenopathy     There is 1 isolated slightly prominent lymph node noted in the left axilla.  This seems to be slightly larger than the contralateral side.  The lymph node measures approximately 8 mm in diameter and is smooth and mobile.  The lymph node is slightly tender to palpation.  The overlying skin appears normal with no erythema.  Skin is normal in temperature.  Breast exam is normal.  I expect that this lymph node will resolve to its normal size with watchful waiting.  However to reassure the patient I have given her a prescription for amoxicillin which she can keep in her purse and in case the lymph node worsens she can start a course of amoxicillin.  If this lymph node does not improve and/or worsens she should return to clinic for further evaluation and possible consultation with general surgery for biopsy.         Relevant Medications    amoxicillin (AMOXIL) 500 MG capsule      Other Visit Diagnoses     Screen for colon cancer    -  Primary           Chief Complaint   Patient presents with     Lump under armpit     pain when pressing on it.        HPI  Jeimy Lewis is a 40 y.o. female notes that she has a prominent lymph node in the left axilla which is present for the last couple days.  It is not very tender but she noted it so she decided to come in to be seen.    Social History     Tobacco Use   Smoking Status Never Smoker   Smokeless Tobacco Never Used      Current Outpatient Medications on File Prior to Visit   Medication Sig Dispense Refill     fluticasone (FLONASE) 50 mcg/actuation nasal spray Apply 1 spray into each nostril daily.       levonorgestrel (MIRENA) 20 mcg/24 hr (5 years) IUD 1 each by Intrauterine route once.       loratadine (CLARITIN) 10 mg tablet Take 10 mg by mouth daily.       naproxen (NAPROSYN) 500 MG tablet Take 1 tablet (500 mg total) by mouth 2 (two) times a day as needed (for pain).  60 tablet 2     No current facility-administered medications on file prior to visit.       No Known Allergies      Review of Systems   Constitutional: Negative.    HENT: Negative.    Eyes: Negative.    Respiratory: Negative.    Cardiovascular: Negative.    Gastrointestinal: Negative.    Endocrine: Negative.    Genitourinary: Negative.    Musculoskeletal: Negative.    Skin: Negative.    Neurological: Negative.    Hematological: Negative.    Psychiatric/Behavioral: Negative.         OBJECTIVE: /64 (Patient Site: Left Arm, Patient Position: Sitting, Cuff Size: Adult Large)   Pulse 85   Wt 198 lb (89.8 kg)   SpO2 99%   BMI 32.95 kg/m     Physical Exam  Constitutional:       General: She is not in acute distress.     Appearance: She is well-developed.   HENT:      Head: Normocephalic and atraumatic.   Eyes:      Conjunctiva/sclera: Conjunctivae normal.   Neck:      Musculoskeletal: Neck supple.   Cardiovascular:      Rate and Rhythm: Normal rate.   Pulmonary:      Effort: Pulmonary effort is normal.   Chest:      Breasts: Breasts are symmetrical.         Right: Normal. No swelling, bleeding, inverted nipple, mass, nipple discharge, skin change or tenderness.         Left: Normal. No swelling, bleeding, inverted nipple, mass, nipple discharge, skin change or tenderness.   Musculoskeletal: Normal range of motion.   Lymphadenopathy:      Upper Body:      Left upper body: Axillary adenopathy present.      Comments: 8mm slightly tender left axillary lymph node. Seems slightly promient compared to other side.    Skin:     General: Skin is warm and dry.   Neurological:      Mental Status: She is alert and oriented to person, place, and time.          This note was created using Dragon dictation.  Please excuse any grammatical errors.

## 2021-06-15 NOTE — PROGRESS NOTES
Assessment & Plan:  1. Acute sinusitis, recurrence not specified, unspecified location  amoxicillin-clavulanate (AUGMENTIN) 875-125 mg per tablet       Follow up with us if symptoms are not improving after the course of antibiotics is complete.    Take antibiotics with food.    Use Mucinex 12 hour twice daily and loosen mucus. Increase water intake.    Saline spray in both nostrils for moisture and to thin mucus.    Use a humidifier at home to moisten the air.        There are no Patient Instructions on file for this visit.    No orders of the defined types were placed in this encounter.    There are no discontinued medications.        Chief Complaint:   Chief Complaint   Patient presents with     possible sinus infection     x 3 wks. Pressure around left eye       History of Present Illness:  Jeimy is a 37 y.o. female presenting to the clinic today with 3 weeks of sinus symptoms. Symptoms have been worsening over time. Initially symptoms felt like a cold but now have settled on the left side. Feels consistent with a sinus infection. No fevers or chills. She also has a fair amount of drainage and PND. Has been using ibuprofen for pressure.     Review of Systems:  All other systems are negative except as noted above.     PFSH:  Reviewed and updated.     Tobacco Use:  History   Smoking Status     Never Smoker   Smokeless Tobacco     Not on file       Vitals:  Vitals:    01/11/18 0932   BP: 110/76   Pulse: 70   Resp: 14   Temp: 98.5  F (36.9  C)   TempSrc: Oral   Weight: 200 lb 7 oz (90.9 kg)     Wt Readings from Last 3 Encounters:   01/11/18 200 lb 7 oz (90.9 kg)   02/28/17 192 lb 5 oz (87.2 kg)   12/02/16 192 lb (87.1 kg)       Physical Exam:  Constitutional:  Reveals an alert, cooperative, 37 year old female in no acute distress.  Vitals:  Per nursing notes.  Eyes: PERRLA, funduscopic exam within normal limits.  HENT: TMs clear bilaterally,Oropharynx with erythema, clear posterior nasal drainage, no thrush. Neck  supple,  thyroid not palpable. Nasal mucosa boggy, erythematous with increased rhinorrhea. Sinuses nontender to palpation.   Cardiovascular:  Regular rate and rhythm without murmurs, rubs, or gallops. Carotids without bruits. Legs without edema.   Respiratory: Clear.  Respiratory effort normal.  Lymph: Anterior cervical lymphadenopathy present, Posterior cervical lymphadenopathy not present, lymph nodes slightly tender to palpation.   Psychiatric:  Mood appropriate, alert and oriented x3.    Data Reviewed:  Additional History from Old Records or Another Person Summarized (2 total): None.     Decision to Obtain Extra information (1 total): None.     Radiology Tests Summarized and Ordered (XRAY/CT/MRI/DXA) (1 total): None.    Labs Reviewed and Ordered (1 total):0    Medicine Tests Summarized and Ordered (EKG/ECHO/COLONOSCOPY/EGD) (1 total): None.    Independent Review of EKG or X-Ray (2 each): None.    The visit lasted a total of 20 minutes face to face with the patient. Over 50% of the time was spent counseling and educating the patient about plan of care.    Medications:  Current Outpatient Prescriptions   Medication Sig Dispense Refill     levonorgestrel (MIRENA) 20 mcg/24 hr (5 years) IUD 1 each by Intrauterine route once.       amoxicillin-clavulanate (AUGMENTIN) 875-125 mg per tablet Take 1 tablet by mouth 2 (two) times a day for 14 days. 28 tablet 0     triamcinolone (KENALOG) 0.1 % cream Apply daily to affected area for up to 14 days. 30 g 0     No current facility-administered medications for this visit.        Total Data Points:     USMAN Haque, CNP    This note has been dictated using voice recognition software. Any grammatical or context distortions are unintentional and inherent to the software

## 2021-06-15 NOTE — PROGRESS NOTES
FEMALE ADULT PREVENTIVE EXAM    CHIEF COMPLAINT:  Female preventive exam.    SUBJECTIVE:  Jeimy Lewis is a 40 y.o. female.  She is establishing care. Has pertinent medical history   Father was 45 dx- stage 4. Brother had polyps. Colonoscopy at 2013 polyp and 2016. No blood in stool.   No unusual bowel changes.   Mom had breast cancer age 56.   Mirena in place 2nd. Since 2016 no period. [facility] in [place]/   Pregnant twice no complications- 2nd had laila.     New allergy this fall, was raking leaves from last year leaves- got beat red. Rushed to urgency room , tongue swollen, bp rushed. Drove there. Gave steroids for a week. Feels like allergies have gotten worse this year. If gets overheated gets really itchy and gets red welt hives. -gets pin prick ones now with hot showers or if exercise and overheated. Takes claritin once daily. Doesn't keep from happening. Has tried benadryl and zyrtec all the same.   Had anaphylactic episode when was younger 13 when moved to [place] . Got better over time. Every spring still gets a little hives.   Had diagnostic mammogram dec 2020 and was normal.     [place] Did something to heel that made numb. Had a tiny bone spur.  Still numb.  Annoying but not painful.  Doesn't affect way walk.       She  has no past medical history on file.    Lab Results   Component Value Date    WBC 5.8 02/15/2021    HGB 14.6 02/15/2021    HCT 43.3 02/15/2021    MCV 87 02/15/2021     02/15/2021     02/15/2021    K 4.3 02/15/2021    BUN 15 02/15/2021     Lab Results   Component Value Date    CHOL 181 02/15/2021    HDL 62 02/15/2021    LDLCALC 104 02/15/2021    TRIG 75 02/15/2021     Lab Results   Component Value Date    TSH 1.15 02/15/2021     BP Readings from Last 3 Encounters:   02/15/21 111/72   12/15/20 111/73   11/24/20 100/64       Surgeries:  History reviewed. No pertinent surgical history.    Family History:  Her family history  includes Breast cancer (age of onset: 56) in her mother; Colon cancer (age of onset: 45) in her father; Colon polyps in her brother.    Social History:  She  reports that she has never smoked. She has never used smokeless tobacco.    Medications:    Current Outpatient Medications:      cholecalciferol, vitamin D3, 1,000 unit (25 mcg) tablet, Take 1,000 Units by mouth daily., Disp: , Rfl:      fluticasone (FLONASE) 50 mcg/actuation nasal spray, Apply 1 spray into each nostril daily., Disp: , Rfl:      levonorgestrel (MIRENA) 20 mcg/24 hr (5 years) IUD, 1 each by Intrauterine route once., Disp: , Rfl:      loratadine (CLARITIN) 10 mg tablet, Take 10 mg by mouth daily., Disp: , Rfl:      multivitamin therapeutic tablet, Take 1 tablet by mouth daily., Disp: , Rfl:      naproxen (NAPROSYN) 500 MG tablet, TAKE 1 TABLET(500 MG) BY MOUTH TWICE DAILY AS NEEDED FOR PAIN, Disp: 60 tablet, Rfl: 2     montelukast (SINGULAIR) 10 mg tablet, Take 1 tablet (10 mg total) by mouth daily., Disp: 30 tablet, Rfl: 11  HELD MEDICATIONS: None.    Allergies:  No latex allergies.  No Known Allergies    RISK BEHAVIOR & HEALTH HABITS  No history of abnormal Pap smears except might have had a colposcopy once and otherwise normal since then.  Taking vitamin D supplementation  Guns: NO  Sun Screen: YES  Dental Care: YES    REVIEW OF SYSTEMS:  Complete head to toe review of systems is otherwise negative except as above.    OBJECTIVE:  VITAL SIGNS:    Vitals:    02/15/21 0858   BP: 111/72   Pulse: 75   Resp: 20   Temp: 98.1  F (36.7  C)   SpO2: 99%     GENERAL:  Patient alert, in no acute distress.  EYES: PERRLA. Extraoccular movements intact, pupils equal, reactive to light and accommodation.  Normal conjunctiva and lids.  Normal posterior segments, including no exudates or hemorrhages.  ENT:  Hearing grossly normal.  Normal appearance to ears and nose.  Bilateral TM s, external canals, oropharynx normal. Normal lips, gums and teeth.  Normal nasal  mucosa, septum and turbinates.  NECK:  Supple, without thyromegaly or mass.  RESP:  Clear to auscultation without crackles, wheezes or distress.  Normal respiratory effort.   CV:  Regular rate and rhythm without murmurs, rubs or gallops.  Normal carotid, abdominal aorta, femoral and pedal pulses.  No varicosities or edema.  ABDOMEN:  Soft, non-tender, without hepatosplenomegaly, masses, or hernias.   BREASTS:  Nontender, without masses, nipple discharge, erythema, or axillary adenopathy.    :  Normal pelvic exam, including external genitalia with normal appearance and no lesions.  Normal vaginal exam, including no discharge and normal pelvic support, and no lesions.   Normal ureters, with no masses, tenerness or scarring.  Normal bladder, with no fullness, masses or tenderness.  Cervix well visualized, with normal appearance and no lesions or discharge.  Normal uterus, including normal size, shape, mobility with no tenderness.  Normal adnexa, including no nodules, masses or tenderness.  IUD strings in place  LYMPHATIC: Normal palpation of neck, groin and axilla..  No lymphadenopathy.  No bruising.  NEURO:  CN II-XII intact, motor & sensory function all intact.  DTR and reflexes normal.  PSYCHIATRIC:  Alert & oriented with normal mood and affect.  Good judgment and insight.  SKIN:  Normal inspection and palpation.  MUSCULOSKELETAL: Normal gait and station.  - Spine / Ribs / Pelvis: Normal inspection, ROM, stability and strength: Spine, Head, Neck, Upper and Lower Extremities.    ASSESSMENT & PLAN  Jeimy was seen today for annual exam, heel pain and allergies.    Diagnoses and all orders for this visit:    Encounter for general adult medical examination with abnormal findings-fasting labs obtained today up-to-date on immunizations.  -     Lipid [place] FASTING  -     Comprehensive Metabolic Panel  -     HM2(CBC w/o Differential)  -     Thyroid [place]    Lipid screening  -     Lipid [place] FASTING    Screening for  cervical cancer  -     Gynecologic Cytology (PAP Smear)    Encounter for hepatitis C screening test for low risk patient  -     Hepatitis C Antibody (Anti-HCV)    Screen for colon cancer/Family history of colon cancer  -     Ambulatory referral for Colonoscopy      Family history of malignant neoplasm of breast-patient has already started annual breast cancer screening due to mother's history    H/O allergy, presenting hazards to health-patient has already been rotating different antihistamines without success.  Continues to have allergy not only to overheating but also has had a couple anaphylactic-like episodes to things in the environment.  I suggested taking an additional Claritin before exercise.  We will also try Singulair if it is not helpful in the next month then she can discontinue it.  Allergy referral placed in case they have other advice  -     Ambulatory referral to Allergy  -     montelukast (SINGULAIR) 10 mg tablet; Take 1 tablet (10 mg total) by mouth daily.    Family history of thyroid disease  -     Thyroid [place]    Paresthesia  of right heel-patient prefers to just monitor for now.  Has bone spur.  Not interested in seeing podiatry at this time    IUD (intrauterine device) in place 2016 mirena-encouraged Jeimy to let me know when she is ready to have this removed and reinserted with a new device      General  Immunizations reviewed and updated .  Alcohol use, safety and moderation discussed.  Recommended adequate calcium intake/osteoporosis prevention.  Discussed colon cancer screening at age 50, 45 if -American.  Diet and exercise reviewed, including goal of aerobic exercise 30-90 minutes most days of the week, moderation of portions sizes, avoiding eating out and fast food and increase in fruits and vegetables.  Discussed safe sex practices.  Discussed & recommended seat belt (& motorcycle helmet) use.  Discussed & recommended smoke detector.  Discussed sun protection.  Discussed  weight management.

## 2021-06-15 NOTE — TELEPHONE ENCOUNTER
RN cannot approve Refill Request    RN can NOT refill this medication med is not covered by policy/route to provider. Last office visit: 12/15/2020 Kirti Delgado DO Last Physical: Visit date not found Last MTM visit: Visit date not found Last visit same specialty: 12/15/2020 Kirti Delgado DO.  Next visit within 3 mo: Visit date not found  Next physical within 3 mo: Visit date not found      Marlena Polanco, [facility] Triage/Med Refill 2/5/2021    Requested Prescriptions   Pending Prescriptions Disp Refills     naproxen (NAPROSYN) 500 MG tablet [Pharmacy Med Name: NAPROXEN 500MG TABLETS] 60 tablet 2     Sig: TAKE 1 TABLET(500 MG) BY MOUTH TWICE DAILY AS NEEDED FOR PAIN       There is no refill protocol information for this order

## 2021-06-16 NOTE — PROGRESS NOTES
"      Subjective   Jeimy Lewis is 40 y.o. and presents today for the following health issues   HPI chief complaint: Hives, anaphylaxis    History of present illness: This is a pleasant 40-year-old woman I was asked to see for evaluation by Dr. Delgado in regards to hives.  Patient states that she moved from [place] to here when she was 12 years old.  In the spring, she developed an anaphylactic shock.  It was not clear what the trigger was.  She carried an EpiPen for for 5 years at that time.  She states that ever since that time she will have frequent hives.  She has had frequent allergic reactions every 4 to 6 years or so.  She states that she will turn beet red.  She will become hot have diarrhea and tongue swelling.  She states most recently this happened in September.  She presented to the emergency room.  Her blood pressure was decreased.  She received Benadryl and steroids for about 5 days.  She had daily hives.  They worsen after a hot shower, humid weather or hot weather or stress.  She states she does have allergic rhinitis symptoms of watery eyes sneezing drainage and runny nose.  No history of asthma.  She does take Claritin daily.  This does not suppress the hives.  She was tried on montelukast and this did not help either.    Past medical history: Appendectomy, deviated septum surgery, bursitis of the right hip    Social history: She works as a , lives in a home with 2 dogs and 2 cats, central air, non-smoker, non-smoking environment    Family history: Sister with bee allergy, multiple family numbers with environmental allergies          Review of Systems  Review of Systems performed as above and the remainder is negative.      Objective    Pulse 74   Ht 5' 5\" (1.651 m)   Wt 201 lb (91.2 kg)   SpO2 99%   BMI 33.45 kg/m    Body mass index is 33.45 kg/m .  Physical Exam  Gen: Pleasant female not in acute distress  HEENT: Eyes no erythema of the bulbar or palpebral conjunctiva, no " edema. Ears: No deformities or lesions. Nose: No congestion, . Mouth: Throat clear, s or lesions  Respiratory: No coughing with breathing, no retractions  Lymph: No visible supraclavicular or cervical lymphadenopathy  Skin: No rashes or lesions, dermatographia  Psych: Alert and oriented times 3      30 percutaneous test replaced, dermatographia and unable to interpret the test    Impression report and plan:  1.  Chronic urticaria  2.  History of anaphylaxis  3.  Dermatographia  4.  Allergic rhinitis    Unable to interpret her skin test due to dermatographia.  For this reason I would like to check specific IgE to the respiratory disease panel.  I believe she has chronic urticaria.  She had episodes of anaphylaxis this could be cholinergic urticaria or cold-induced urticaria as well.  I do wonder about mast cell disease.  I would like to check a tryptase +24-hour urine mast cell mediators.  I will do a systemic work-up for chronic urticaria including TSH, AST, ALT, creatinine, vitamin D and a CBC with differential as well as an MAMI.  I would like her to increase her Claritin to 2 tablets twice a day.  I will also prescribe her an epinephrine device to have at home and I went over when to use this.  I will contact patient when testing returns.

## 2021-06-16 NOTE — PATIENT INSTRUCTIONS - HE
Loratidine 10 mg 2 tabs twice daily     Chronic hives    85% of patients resolve in 1-2 years    Watch triggers    Epi

## 2021-06-18 NOTE — PROGRESS NOTES
ASSESSMENT & PLAN:  Problem List Items Addressed This Visit     None      Visit Diagnoses     AC joint pain    -  Primary    Relevant Orders    XR Shoulder Left 2 or More VWS (Completed)    Left shoulder pain        Relevant Orders    XR Shoulder Left 2 or More VWS (Completed)        Patient presented today with painful left shoulder that I think is secondary to AC joint tendinitis.  No findings seen on imaging today.  She does have a painful arc and other findings that can be consistent with a subacromial impingement.  We discussed how this could potentially lead to problems with her rotator cuff muscles we work on stabilizing the muscles around.  She is willing to consider physical therapy and so referral has been placed for therapy of her shoulder.  Preemptively did give her some exercises that she could look over and start.  Recommended stop doing any of the exercises she is doing currently that is causing any pain in her exercise classes.  She can use ice and NSAIDs as needed for discomfort as this may also help suppress inflammation.  If no improvement in the next 6 weeks would consider MRI or injection     There are no Patient Instructions on file for this visit.     Orders Placed This Encounter   Procedures     XR Shoulder Left 2 or More VWS     Standing Status:   Future     Number of Occurrences:   1     Standing Expiration Date:   6/8/2019     Scheduling Instructions:      EXT/GRA     Order Specific Question:   Reason for Exam (Describe Symptoms):     Answer:   Left shoulder pain with painful arc. pain localized over AC joint with clicking. eval for structural defect     Order Specific Question:   Is the patient pregnant?     Answer:   No     Order Specific Question:   Can the procedure be changed per Radiologist protocol?     Answer:   Yes     There are no discontinued medications.    No Follow-up on file.     CHIEF COMPLAINT:  Chief Complaint   Patient presents with     Shoulder Pain     Left Shoulder,  Front part of left shoulder, constant dull achy pain, intermintent sharp pain with movement, possible injury from doing jumping jacks at the gym 2 days ago         HISTORY OF PRESENT ILLNESS:  Jeimy is a 37 y.o. female presenting to the clinic today for evaluation of her left shoulder pain. She is a patient of Deisi Duncan NP. She has been having some brief pain in her left shoulder for a while now, but while doing jumping jacks at the gym the other day during a class, she felt an excruciating sharp pain in her left shoulder. She maybe heard a pop, but it is hard for her to remember. She finished the class not doing anything with her left arm. On Tuesday 6/5/2018, it was hard to use her left arm. Now, she is having difficulty getting clothes on because of her left arm/shoulder pain. Also, putting too much resistance on her left arm is painful. When she has a sharp pain in the left shoulder, the pain is 7-8/10. It has been hard for her to sleep; it is difficult for her to find a comfortable position because of her left shoulder. She can do some more things now than she could on Tuesday, but there is a dull achy pain and occasionally a sharp pain in her left shoulder. She has taken some ibuprofen and that helps to lessen the pain. She doesn't want to just keep taking ibuprofen forever though. She has been trying to get back in shape, but she is allergic to her own sweat; she develops hives all over. She takes 1-2 daily Claritin daily, depending on the severity of her hives; she was diagnosed with this allergy last April. She tries to take luke warm showers, but sometimes she just wants a hot shower. She has been going to the gym 2-3 times per week; she goes to [employer], and she does group classes. In class, they do rotating stations of dumbbells, TRX bands, and most of it is repetition. None of it is high impact that she is aware of. The  is watching everyone do the exercises. They do some overhead  weights, and rowing. It is a small class, usually 3-6 people, sometimes up to 20 people on a Saturday. The trainers have corrected her when she does things wrong. She has never had shoulder problems before, fallen, or landed on an outstretched arm. The pain goes down her left arm, and the last couple fingers will become tingly. She doesn't have any neck issues. She is right handed. She is sore where her left clavicle meets her shoulder, and then the rest of her arm gets sore when she moves her arm across her chest and out to the side especially. She played some sports in school, but not intensely. She played volleyball for a year. She would be willing to start physical therapy. She would like to get an x-ray today since her shoulder pain has been going on for a while.     REVIEW OF SYSTEMS:   She denies any bruising or swelling of the shoulder. All other systems are negative.     PFSH:    TOBACCO USE:  History   Smoking Status     Never Smoker   Smokeless Tobacco     Never Used        VITALS:  Vitals:    06/07/18 0949   BP: 102/70   Patient Site: Left Arm   Patient Position: Sitting   Cuff Size: Adult Large   Pulse: 76   Resp: 16   Temp: 97.9  F (36.6  C)   TempSrc: Oral   Weight: 195 lb 8 oz (88.7 kg)     Wt Readings from Last 3 Encounters:   06/07/18 195 lb 8 oz (88.7 kg)   01/11/18 200 lb 7 oz (90.9 kg)   02/28/17 192 lb 5 oz (87.2 kg)     Body mass index is 33.56 kg/(m^2).  No LMP recorded. Patient has had an implant.     PHYSICAL EXAM:  GENERAL:  Reveals an alert 37 y.o. female in NAD.  Vitals:  Per nursing notes.  CARDIAC:  Regular rate and rhythm without murmurs, rubs, or gallops. Legs without edema. Carotids without bruits.   LUNGS: Clear.  Respiratory effort normal.  NEURO:  Cranial nerves II-XII intact.     PSYCH:  Mood appropriate, memory intact.   MSK: Left Shoulder: Pain along distal clavicle and AC joint. Full ROM inactive  flexion, but pain starting at about 80 degrees. Painful arc with abduction 90  degrees to 120. Full ROM with passive abduction and flexion. Pain with crossover testing. Negative Borjas' test. 3/5 muscle strength testing with Smitha's testing and discomfort , 4+/5 with abduction. Negative Neer's test. Negative Speed's test.      Left Shoulder X-Ray: Does not show any pathology of the joint space of the shoulder or AC joint    QUALITY MEASURES:  The following high BMI interventions were performed this visit: encouragement to exercise    DATA REVIEWED:  ADDITIONAL HISTORY SUMMARIZED (2): None.    DECISION TO OBTAIN EXTRA INFORMATION (1): None.   RADIOLOGY TESTS (1): Ordered left shoulder x-ray.  LABS (1): None.  MEDICINE TESTS (1): None.  INDEPENDENT REVIEW (2 each): Reviewed today's left shoulder x-ray.     The visit lasted a total of 25 minutes face to face with the patient. Over 50% of the time was spent counseling and educating the patient about her left shoulder pain, exercise, treatment options, and follow up.     IJustina, am scribing for and in the presence of Dr. Delgado.  Kirti KING DO, personally performed the services described in this documentation, as scribed by Justina Rosa in my presence, and it is both accurate and complete.    This note has been dictated using voice recognition software. Any grammatical or context distortions are unintentional and inherent to the software.     MEDICATIONS:  Current Outpatient Prescriptions   Medication Sig Dispense Refill     levonorgestrel (MIRENA) 20 mcg/24 hr (5 years) IUD 1 each by Intrauterine route once.       loratadine (CLARITIN) 10 mg tablet Take 10 mg by mouth daily.       triamcinolone (KENALOG) 0.1 % cream Apply daily to affected area for up to 14 days. 30 g 0     No current facility-administered medications for this visit.         Total data points: 3

## 2021-06-18 NOTE — PROGRESS NOTES
[facility] Discharge Summary  Patient Name: Jeimy Lewis  Date: 11/28/2018  Referral Diagnosis: [unfilled]  Referring provider: Kirti Delgado, DO  Visit Diagnosis:   1. Arthralgia of left acromioclavicular joint     2. Muscle weakness (generalized)     3. Poor posture         Patient was seen for 1 visits from 6/14/2018 with 1 cancelled missed appointments.  The patient discontinued therapy, did not return.  No further therapy is required at this time.  patient cancelled her follow up appointment due to a sick child but then did not reschedule appointment. chart was held greater than 30 days and will now be discharged     Therapy will be discontinued at this time.  The patient will need a new referral to resume.    Thank you for your referral.  Antonia Andrade  11/28/2018  11:52 AM    [facility]   Shoulder Initial Evaluation    Patient Name: Jeimy Lewis  Date of evaluation: 6/14/2018  Referral Diagnosis: Achilles tendinitis  Referring provider: Kirti Delgado, DO  Visit Diagnosis:     ICD-10-CM    1. Arthralgia of left acromioclavicular joint M25.512    2. Muscle weakness (generalized) M62.81    3. Poor posture R29.3        Assessment:     Jeimy Lewis is a 37 y.o. female who presents to therapy today with chief complaints of left shoulder pain following a workout at the gym last week.. Onset date of sx was sudden sharp pain that progressively worsen last week and is now starting to decrease in pain.  Pt reported h/o on and off shoulder pain that started with starting to workout out over the last 2 months.  Pain symptoms are worse with reaching overhead and behind their back, positive finding for AC joint pain, negative testing for Rotator cuff pathology Patient will benefit from skilled PT intervention to decrease pain and inflammation, increase ROM and return to prior level of function.       Impairments in  pain, posture, ROM, joint mobility, strength  The POC is dynamic and  will be modified on an ongoing basis.  Barriers to achieving goals as noted in the assessment section may affect outcome.  Prognosis to achieve goals is  good   Pt. is appropriate for skilled PT intervention as outlined in the Plan of Care (POC).  Pt. is a good candidate for skilled PT services to improve pain levels and function.    Goals:  Pt. will be independent with home exercise program in : 6 weeks  Pt. will have improved quality of sleep: with less pain;waking less times/night  Patient will reach / maintain arm movement: forward;overhead;behind;for home chores;for dressing;for sport/recreation;with less pain;with less difficulty;in 6 weeks  Patient will perform repetitive movement in : arm;for work;for home;with less pain;with less difficulty;in 6 weeks  No Data Recorded    Patient's expectations/goals are realistic.    Barriers to Learning or Achieving Goals:  No Barriers.       Plan / Patient Instructions:        Plan of Care:   Authorization / Certification Start Date: 06/14/18  Communication with: Referral Source  Patient Related Instruction: Nature of Condition;Treatment plan and rationale;Self Care instruction;Basis of treatment;Body mechanics;Posture;Precautions;Next steps;Expected outcome  Times per Week: 1-2  Number of Weeks: 6-8  Number of Visits: up to 10 sessions  Therapeutic Exercise: ROM;Stretching;Strengthening  Neuromuscular Reeducation: kinesio tape;postural restoration;posture;core  Manual Therapy: soft tissue mobilization;myofascial release;joint mobilization;muscle energy;strain counterstrain  Modalities: electrical stimulation;TENS;iontophoresis;ultrasound;cold pack;hot pack    POC and pathology of condition were reviewed with patient.  Pt. is in agreement with the Plan of Care  A Home Exercise Program (HEP) was initiated today.  Pt. was instructed in exercises by PT and patient was given a handout with detailed instructions.     Plan for next visit: assess KT tape, progress scap and  posterior shoulder strengthening exercises   .   Subjective:       Social information:   Living Situation:single family home and lives with others    Occupation:Marketing    Work Status:Working full time   Equipment Available: None    History of Present Illness:    Jeimy is a 37 y.o. female who presents to therapy today with complaints of left shoulder pain that started a week ago without a known injury to the shoulder. She states she started going back to the gym about 2 months ago and was doing a body pump type class and she would have on and off pain in the shoulder but the pain would go away after a day or two. She states last week Tuesday she did a jumping alexi and there was sharp pain that increased in pain and she went to the MD on Thursday. They did xrays and they were negative. Pain started to get better over the weekend but she is worried about it coming back again.      Pain Ratin  Pain rating at best: 0  Pain rating at worst: 8  Pain description: aching and sharp    Functional limitations are described as occurring with:   lifting  reaching at shoulder height, overhead and behind back  performing routine daily activities  sleeping    Patient reports benefit from:  rest  , anti-inflammatory, cold       Objective:      Note: Items left blank indicates the item was not performed or not indicated at the time of the evaluation.    Patient Outcome Measures :    Shoulder Pain and Disability Index (SPADI) in %: 58.5   Scores range from 0-100%, where a score of 0% represents minimal pain and maximal function. The minimal clincically important difference is a score reduction of 10%.    Shoulder Examination  1. Arthralgia of left acromioclavicular joint     2. Muscle weakness (generalized)     3. Poor posture       Involved side: Left  Posture Observation:      General sitting posture is  normal.  Cervical:  Mild forward head  Shoulder/Thoracic complex: Mild right scapular protraction   Cervical Clearing:  Normal    Shoulder/Elbow ROM  6/14/2018  Date: 6/14/2018     Shoulder and Elbow ROM ( )   AROM in degrees AROM in degrees AROM in degrees    Right Left Right Left Right Left   Shoulder Flexion (0-180 ) 170 160 tight and uncomfortable        Shoulder Abduction (0-180 ) 165 165       Shoulder Extension (0-60 ) WNL WNL       Shoulder ER (0-90 ) WNL WNL       Shoulder IR (0-70 )         Shoulder IR/Ext WNL WNL uncomfortable       Elbow Flexion (150 ) WNL WNL       Elbow Extension (0 ) WNL WNL        PROM in degrees PROM in degrees PROM in degrees    Right Left Right Left Right Left   Shoulder Flexion (0-180 )         Shoulder Abduction (0-180 )         Shoulder Extension (0-60 )         Shoulder ER (0-90 )         Shoulder IR (0-70 )         Elbow Flexion (150 )         Elbow Extension (0 )           Shoulder/Elbow Strength 6/14/2018  Date: 6/14/2018     Shoulder/Elbow Strength (/5)  Manual Muscle Test (MMT) MMT MMT MMT    Right Left Right Left Right Left   Shoulder Flexion 5 5- pain       Supraspinatus 5 5       Shoulder Abduction 5 5       Shoulder Extension 5 5       Shoulder External Rotation 5 5 tight       Shoulder Internal Rotation 5 5 tight       Elbow Flexion 5 5       Elbow Extension 5 5       Other:         Other:             Palpation: moderate tenderness to the ACJ anteriorly on the left shoulder    Joint Assessment:  Acromioclavicular Joint Assessment: tender and painful with compression   Scapulothoracic Joint Assessment: WNL.  Glenohumeral Joint Assessment:WNL.    Shoulder Special Tests   6/14/2018  Impingement Cluster Right (+/-) Left (+/-) Rotator Cuff Tests Right (+/-) Left (+/-)   Borjas-Eben - - Drop Arm Sign     Painful Arc - - Hornblowers     Infraspinatus Test   ERLS     AC Tests Right (+/-) Left (+/-) IRLS     Active Compression  + Labral Tests Right (+/-) Left (+/-)   Crossbody Adduction  + Biceps Load Test II     AC Resisted Extension  - Jerk Test     GH Instability Tests Right (+/-) Left  (+/-) Belia Test     Sulcus Sign  - Biceps Tests Right (+/-) Left (+/-)   Apprehension  - Speed     Relocation  - Shahnaz rizvi     Other:   Other:     Other:   Other:         Treatment Today  6/14/2018  TREATMENT MINUTES COMMENTS   Evaluation 35    Self-care/ Home management     Manual therapy     Neuromuscular Re-education 10 KT tape U shape posterior to anterior shoulder and then across the top of the shoulder over the ACJ   Therapeutic Activity     Therapeutic Exercises 13 Initiated HEP with handouts given today   - doorway pec stretching  - L3 band rows  - L3 band standing ER    Gait training     Modality__________________                Total 58    Blank areas are intentional and mean the treatment did not include these items.     PT Evaluation Code: (Please list factors)  Patient History/Comorbidities: as above   Examination: as above   Clinical Presentation: stable   Clinical Decision Making: low     Patient History/  Comorbidities Examination  (body structures and functions, activity limitations, and/or participation restrictions) Clinical Presentation Clinical Decision Making (Complexity)   No documented Comorbidities or personal factors 1-2 Elements Stable and/or uncomplicated Low   1-2 documented comorbidities or personal factor 3 Elements Evolving clinical presentation with changing characteristics Moderate   3-4 documented comorbidities or personal factors 4 or more Unstable and unpredictable High     Antonia Andrade, PT, DPT   6/14/2018  11:35 AM

## 2021-06-20 NOTE — PROGRESS NOTES
ASSESSMENT & PLAN:  1. Health maintenance examination  Healthy female exam.  Patient will return for fasting labs.  Patient recently found out there is some family history for thyroid conditions and some other autoimmune conditions.  She would like her thyroid checked given some ongoing fatigue.  Will follow for lab results and let her know when the results are available.  Patient is up-to-date on Pap smear as this is done by GYN.  - Glucose; Future  - Thyroid Cascade; Future  - Vitamin D, Total (25-Hydroxy); Future  - Lipid Leslie FASTING; Future      There are no Patient Instructions on file for this visit.    Orders Placed This Encounter   Procedures     Glucose     Standing Status:   Future     Standing Expiration Date:   8/31/2019     Thyroid Cascade     Standing Status:   Future     Standing Expiration Date:   8/31/2019     Vitamin D, Total (25-Hydroxy)     Standing Status:   Future     Standing Expiration Date:   8/31/2019     Lipid Leslie FASTING     Standing Status:   Future     Standing Expiration Date:   8/31/2019     Order Specific Question:   Fasting is required?     Answer:   Yes     Medications Discontinued During This Encounter   Medication Reason     triamcinolone (KENALOG) 0.1 % cream Therapy completed           General  Immunizations reviewed and updated .  Alcohol use, safety and moderation discussed.  Recommended adequate calcium intake/osteoporosis prevention.  Discussed colon cancer screening at age 50, 45 if -American.  Diet and exercise reviewed, including goal of aerobic exercise 30-90 minutes most days of the week, moderation of portions sizes, avoiding eating out and fast food and increase in fruits and vegetables.  Discussed safe sex practices.  Discussed & recommended seat belt (& motorcycle helmet) use.  Discussed & recommended smoke detector.  Discussed sun protection.  Discussed weight management.    The following high BMI interventions were performed this visit:  encouragement to exercise and weight monitoring    CHIEF COMPLAINT:  Chief Complaint   Patient presents with     Annual Exam       HISTORY OF PRESENT ILLNESS:  Jeimy is a 38 y.o. female presenting to the clinic today for a physical examination.  Patient is feeling well, has some questions today about if she should have her thyroid checked.  There is been some recent family history of thyroid disease and some of her female relatives, grandmother being the most recent.  There is also some other autoimmune disease on that side of the family, she is wondering if she should have her thyroid checked as well.  She has not noticed many symptoms but has been a little more fatigued lately.  Patient gets her Paps done at GYN, they have been normal in the past.  She recently had one several months ago in February.  She is not fasting today but would like to update her lab work and can come back to this if necessary.    REVIEW OF SYSTEMS:   All other systems are negative.    PFSH:  Social History:  The patient reports that there is not domestic violence in her life.     Regular Exercise: yes  Sunscreen Use: yes  Healthy Diet: yes  Dental Visits Regularly: yes  Sexually active: yes  Colonoscopy: not applicable  Prevention of Osteoporosis: not applicable   Last DXA: not applicable    Social History     Social History     Marital status:      Spouse name: N/A     Number of children: N/A     Years of education: N/A     Occupational History     Not on file.     Social History Main Topics     Smoking status: Never Smoker     Smokeless tobacco: Never Used     Alcohol use Not on file     Drug use: Not on file     Sexual activity: Not on file     Other Topics Concern     Not on file     Social History Narrative       History   Smoking Status     Never Smoker   Smokeless Tobacco     Never Used       Family History:  No family history on file.    Past Medical History:  Active Ambulatory Problems     Diagnosis Date Noted     No  "Active Ambulatory Problems     Resolved Ambulatory Problems     Diagnosis Date Noted     AC joint pain 06/07/2018     No Additional Past Medical History       No Known Allergies    Immunization History   Administered Date(s) Administered     Influenza,seasonal quad, PF, 36+MOS 11/30/2015     Tdap 02/01/2015     Immunization status: up to date and documented    Gynecologic History:  No LMP recorded. Patient has had an implant.  Contraception:barrier methods  Last Pap: 2017. Results were: normal  Last mammogram: na.     OB History:  OB History     No data available          Surgical History:  No past surgical history on file.    VITALS:  Vitals:    08/31/18 1626   BP: 110/74   Patient Site: Left Arm   Patient Position: Sitting   Cuff Size: Adult Regular   Pulse: 86   Resp: 14   Temp: 99  F (37.2  C)   TempSrc: Oral   Weight: 196 lb 4 oz (89 kg)   Height: 5' 4\" (1.626 m)     Wt Readings from Last 3 Encounters:   08/31/18 196 lb 4 oz (89 kg)   06/07/18 195 lb 8 oz (88.7 kg)   01/11/18 200 lb 7 oz (90.9 kg)       PHYSICAL EXAM:  General Appearance: Reveals an alert, cooperative female.   Vitals:  Per nursing notes.  Head: Normocephalic, without obvious abnormality, atraumatic   Eyes: PERRL, conjunctiva/corneas clear, EOM's intact   Ears: Normal TM's and external ear canals, both ears   Oropharynx: Nares normal, septum midline, mucosa normal, no drainage, lips, and tongue normal   Neck: Supple, symmetrical, trachea midline, no adenopathy; thyroid: not enlarged, symmetric, no tenderness/mass/nodules   Back: Symmetric, no curvature, ROM normal, no CVA tenderness   Lungs: Clear to auscultation bilaterally, respirations unlabored, no wheezing or rales   Heart: Regular rate and rhythm, S1 and S2 normal, no murmur, rub, or gallop, no carotid bruits  Breasts: No breast masses, tenderness, asymmetry, or nipple discharge.   Abdomen: Soft, non-tender, no masses, no organomegaly,  Pelvic: declined done at GYN  Extremities: " Extremities normal, atraumatic, no cyanosis or edema   Skin: Skin color, texture, turgor normal, no rashes or lesions   Lymph nodes: Cervical, supraclavicular, and axillary nodes normal.  Neurologic: Normal   Psych: Normal affect. Does not appear anxious or depressed.     DATA REVIEWED:  Additional History from Old Records or Another Person Summarized (2 total): None.     Decision to Obtain Extra information (1 total): None.     Radiology Tests Summarized and Ordered (XRAY/CT/MRI/DXA) (1 total): None.    Labs Reviewed and Ordered (1 total): None.    Medicine Tests Summarized and Ordered (EKG/ECHO/COLONOSCOPY/EGD) (1 total): None.    Independent Review of EKG or X-Ray (2 each): None.    The visit lasted a total of 40 minutes face to face with the patient. Over 50% of the time was spent conducting the physical and in coordination of care.      MEDICATIONS:  Current Outpatient Prescriptions   Medication Sig Dispense Refill     fluticasone (FLONASE) 50 mcg/actuation nasal spray Apply 1 spray into each nostril daily.       levonorgestrel (MIRENA) 20 mcg/24 hr (5 years) IUD 1 each by Intrauterine route once.       loratadine (CLARITIN) 10 mg tablet Take 10 mg by mouth daily.       No current facility-administered medications for this visit.        Total Data Points:     Deisi Duncan CNP    This note has been dictated using voice recognition software. Any grammatical or context distortions are unintentional and inherent to the software

## 2021-06-21 NOTE — LETTER
Letter by Sakshi Delgadillo MD at      Author: Sakshi Delgadillo MD Service: -- Author Type: --    Filed:  Encounter Date: 3/16/2021 Status: (Other)         March 16, 2021     Kirti Delgado DO  [address]  [facility] [address]    Patient: Jeimy Lewis   MR Number: [id number]   YOB: 1980   Date of Visit: 3/16/2021     Dear Dr. Delgado, DO:    Thank you for referring Jeimy Lewis to me for evaluation.  I am worried about mast cell disease given her history of anaphylaxis.  I do believe she has chronic urticaria as well.  I have included my note for your review.    If you have questions, please do not hesitate to call me.    Sincerely,        Sakshi Delgadillo MD        CC  No Recipients    Progress Notes:      Subjective   Jeimy Lewis is 40 y.o. and presents today for the following health issues   HPI chief complaint: Hives, anaphylaxis    History of present illness: This is a pleasant 40-year-old woman I was asked to see for evaluation by Dr. Delgado in regards to hives.  Patient states that she moved from [place] to here when she was 12 years old.  In the spring, she developed an anaphylactic shock.  It was not clear what the trigger was.  She carried an EpiPen for for 5 years at that time.  She states that ever since that time she will have frequent hives.  She has had frequent allergic reactions every 4 to 6 years or so.  She states that she will turn beet red.  She will become hot have diarrhea and tongue swelling.  She states most recently this happened in September.  She presented to the emergency room.  Her blood pressure was decreased.  She received Benadryl and steroids for about 5 days.  She had daily hives.  They worsen after a hot shower, humid weather or hot weather or stress.  She states she does have allergic rhinitis symptoms of watery eyes sneezing drainage and runny nose.  No history of asthma.  She does take Claritin daily.  This does not suppress the hives.  She was  "tried on montelukast and this did not help either.    Past medical history: Appendectomy, deviated septum surgery, bursitis of the right hip    Social history: She works as a , lives in a home with 2 dogs and 2 cats, central air, non-smoker, non-smoking environment    Family history: Sister with bee allergy, multiple family numbers with environmental allergies          Review of Systems  Review of Systems performed as above and the remainder is negative.      Objective    Pulse 74   Ht 5' 5\" (1.651 m)   Wt 201 lb (91.2 kg)   SpO2 99%   BMI 33.45 kg/m    Body mass index is 33.45 kg/m .  Physical Exam  Gen: Pleasant female not in acute distress  HEENT: Eyes no erythema of the bulbar or palpebral conjunctiva, no edema. Ears: No deformities or lesions. Nose: No congestion, . Mouth: Throat clear, s or lesions  Respiratory: No coughing with breathing, no retractions  Lymph: No visible supraclavicular or cervical lymphadenopathy  Skin: No rashes or lesions, dermatographia  Psych: Alert and oriented times 3      30 percutaneous test replaced, dermatographia and unable to interpret the test    Impression report and plan:  1.  Chronic urticaria  2.  History of anaphylaxis  3.  Dermatographia  4.  Allergic rhinitis    Unable to interpret her skin test due to dermatographia.  For this reason I would like to check specific IgE to the respiratory disease panel.  I believe she has chronic urticaria.  She had episodes of anaphylaxis this could be cholinergic urticaria or cold-induced urticaria as well.  I do wonder about mast cell disease.  I would like to check a tryptase +24-hour urine mast cell mediators.  I will do a systemic work-up for chronic urticaria including TSH, AST, ALT, creatinine, vitamin D and a CBC with differential as well as an MAMI.  I would like her to increase her Claritin to 2 tablets twice a day.  I will also prescribe her an epinephrine device to have at home and I went over when to use " this.  I will contact patient when testing returns.

## 2021-07-11 ENCOUNTER — TRANSFERRED RECORDS (OUTPATIENT)
Dept: HEALTH INFORMATION MANAGEMENT | Facility: CLINIC | Age: 41
End: 2021-07-11

## 2021-07-27 ENCOUNTER — OFFICE VISIT (OUTPATIENT)
Dept: FAMILY MEDICINE | Facility: CLINIC | Age: 41
End: 2021-07-27
Payer: COMMERCIAL

## 2021-07-27 VITALS
WEIGHT: 201 LBS | TEMPERATURE: 98.3 F | DIASTOLIC BLOOD PRESSURE: 69 MMHG | HEIGHT: 65 IN | BODY MASS INDEX: 33.49 KG/M2 | SYSTOLIC BLOOD PRESSURE: 102 MMHG | HEART RATE: 78 BPM

## 2021-07-27 DIAGNOSIS — Z53.9 ERRONEOUS ENCOUNTER--DISREGARD: Primary | ICD-10-CM

## 2021-07-27 ASSESSMENT — MIFFLIN-ST. JEOR: SCORE: 1582.61

## 2021-07-27 ASSESSMENT — ANXIETY QUESTIONNAIRES
3. WORRYING TOO MUCH ABOUT DIFFERENT THINGS: NOT AT ALL
5. BEING SO RESTLESS THAT IT IS HARD TO SIT STILL: NOT AT ALL
GAD7 TOTAL SCORE: 0
2. NOT BEING ABLE TO STOP OR CONTROL WORRYING: NOT AT ALL
4. TROUBLE RELAXING: NOT AT ALL
7. FEELING AFRAID AS IF SOMETHING AWFUL MIGHT HAPPEN: NOT AT ALL
1. FEELING NERVOUS, ANXIOUS, OR ON EDGE: NOT AT ALL
6. BECOMING EASILY ANNOYED OR IRRITABLE: NOT AT ALL

## 2021-07-27 ASSESSMENT — PATIENT HEALTH QUESTIONNAIRE - PHQ9: SUM OF ALL RESPONSES TO PHQ QUESTIONS 1-9: 1

## 2021-07-27 NOTE — PROGRESS NOTES
SUBJECTIVE:   CC: Jeimy Lewis is an 41 year old woman who presents for preventive health visit.     {Split Bill scripting  The purpose of this visit is to discuss your medical history and prevent health problems before you are sick. You may be responsible for a co-pay, coinsurance, or deductible if your visit today includes services such as checking on a sore throat, having an x-ray or lab test, or treating and evaluating a new or existing condition :373954}  Patient has been advised of split billing requirements and indicates understanding: Yes  Healthy Habits:     Getting at least 3 servings of Calcium per day:  NO    Bi-annual eye exam:  Yes    Dental care twice a year:  NO    Sleep apnea or symptoms of sleep apnea:  None    Diet:  Regular (no restrictions)    Frequency of exercise:  1 day/week    Duration of exercise:  Less than 15 minutes    Taking medications regularly:  Yes    Medication side effects:  None    PHQ-2 Total Score: 0    Additional concerns today:  Yes    {Add if <65 person on Medicare  - Required Questions (Optional):677312}  {Outside tests to abstract? :091378}    {additional problems to add (Optional):163114}    Today's PHQ-2 Score:   PHQ-2 ( 1999 Pfizer) 7/20/2021   Q1: Little interest or pleasure in doing things 0   Q2: Feeling down, depressed or hopeless 0   PHQ-2 Score 0   Q1: Little interest or pleasure in doing things Not at all   Q2: Feeling down, depressed or hopeless Not at all   PHQ-2 Score 0       Abuse: Current or Past (Physical, Sexual or Emotional) - No  Do you feel safe in your environment? Yes    Have you ever done Advance Care Planning? (For example, a Health Directive, POLST, or a discussion with a medical provider or your loved ones about your wishes): No, advance care planning information given to patient to review.  {:023433}    Social History     Tobacco Use     Smoking status: Never Smoker     Smokeless tobacco: Never Used   Substance Use Topics     Alcohol use: Not  "on file     {Rooming Staff- Complete this question if Prescreen response is not shown below for today's visit. If you drink alcohol do you typically have >3 drinks per day or >7 drinks per week? (Optional):538231}    Alcohol Use 7/20/2021   Prescreen: >3 drinks/day or >7 drinks/week? No   {add AUDIT responses (Optional) (A score of 7 for adult men is an indication of hazardous drinking; a score of 8 or more is an indication of an alcohol use disorder.  A score of 7 or more for adult women is an indication of hazardous drinking or an alchohol use disorder):602844}    Reviewed orders with patient.  Reviewed health maintenance and updated orders accordingly - { :935837::\"Yes\"}  {Chronicprobdata (optional):174614}    Breast Cancer Screening:  Any new diagnosis of family breast, ovarian, or bowel cancer? No    FHS-7: No flowsheet data found.  {If any of the questions to the BCRA (FHS-7) are answered yes, consider ordering referral for genetic counseling (Optional) :412020::\"click delete button to remove this line now\"}  {AMB Mammogram Decision Support (Optional) :036503}  Pertinent mammograms are reviewed under the imaging tab.    History of abnormal Pap smear: { :652298}  PAP / HPV Latest Ref Rng & Units 2/15/2021   PAP Negative for squamous intraepithelial lesion or malignancy. Negative for squamous intraepithelial lesion or malignancy  Electronically signed by Jillian Lewis CT (ASCP) on 2/22/2021 at  3:01 PM     HPV16 NEG Negative   HPV18 NEG Negative   HRHPV NEG Negative     Reviewed and updated as needed this visit by clinical staff  Tobacco  Allergies  Meds              Reviewed and updated as needed this visit by Provider                {HISTORY OPTIONS (Optional):858997}    Review of Systems  {FEMALE ROS (Optional):394762}     OBJECTIVE:   /69   Pulse 78   Temp 98.3  F (36.8  C) (Oral)   Ht 1.651 m (5' 5\")   Wt 91.2 kg (201 lb)   BMI 33.45 kg/m    Physical Exam  {Exam Choices " "(Optional):900560}    {Diagnostic Test Results (Optional):549349::\"Diagnostic Test Results:\",\"Labs reviewed in Epic\"}    ASSESSMENT/PLAN:   {Diag Picklist:803605}    Patient has been advised of split billing requirements and indicates understanding: {YES / NO:966583::\"Yes\"}  COUNSELING:  {FEMALE COUNSELING MESSAGES:321669::\"Reviewed preventive health counseling, as reflected in patient instructions\"}    Estimated body mass index is 33.45 kg/m  as calculated from the following:    Height as of this encounter: 1.651 m (5' 5\").    Weight as of this encounter: 91.2 kg (201 lb).    {Weight Management Plan (ACO) Complete if BMI is abnormal-  Ages 18-64  BMI >24.9.  Age 65+ with BMI <23 or >30 (Optional):179600}    She reports that she has never smoked. She has never used smokeless tobacco.      Counseling Resources:  ATP IV Guidelines  Pooled Cohorts Equation Calculator  Breast Cancer Risk Calculator  BRCA-Related Cancer Risk Assessment: FHS-7 Tool  FRAX Risk Assessment  ICSI Preventive Guidelines  Dietary Guidelines for Americans, 2010  USDA's MyPlate  ASA Prophylaxis  Lung CA Screening    Meagan Cerda DO  [facility]  "

## 2021-07-28 ASSESSMENT — ANXIETY QUESTIONNAIRES: GAD7 TOTAL SCORE: 0

## 2021-09-21 ENCOUNTER — OFFICE VISIT (OUTPATIENT)
Dept: FAMILY MEDICINE | Facility: CLINIC | Age: 41
End: 2021-09-21
Payer: COMMERCIAL

## 2021-09-21 ENCOUNTER — ANCILLARY PROCEDURE (OUTPATIENT)
Dept: GENERAL RADIOLOGY | Facility: CLINIC | Age: 41
End: 2021-09-21
Attending: NURSE PRACTITIONER
Payer: COMMERCIAL

## 2021-09-21 VITALS
DIASTOLIC BLOOD PRESSURE: 60 MMHG | BODY MASS INDEX: 33.86 KG/M2 | WEIGHT: 203.5 LBS | HEART RATE: 74 BPM | OXYGEN SATURATION: 100 % | SYSTOLIC BLOOD PRESSURE: 100 MMHG

## 2021-09-21 DIAGNOSIS — M25.531 RIGHT WRIST PAIN: ICD-10-CM

## 2021-09-21 DIAGNOSIS — M25.531 RIGHT WRIST PAIN: Primary | ICD-10-CM

## 2021-09-21 PROCEDURE — 90471 IMMUNIZATION ADMIN: CPT | Performed by: NURSE PRACTITIONER

## 2021-09-21 PROCEDURE — 99214 OFFICE O/P EST MOD 30 MIN: CPT | Mod: 25 | Performed by: NURSE PRACTITIONER

## 2021-09-21 PROCEDURE — 90686 IIV4 VACC NO PRSV 0.5 ML IM: CPT | Performed by: NURSE PRACTITIONER

## 2021-09-21 PROCEDURE — 73110 X-RAY EXAM OF WRIST: CPT | Mod: TC | Performed by: RADIOLOGY

## 2021-09-21 NOTE — PROGRESS NOTES
Assessment and Plan:     Right wrist pain  Differentials include myofascial pain, muscular strain, carpal tunnel syndrome, ulnar nerve impingement.  I suspect ulnar nerve impingement.  Recommend wrist brace for stability to assist with the pain within the wrist.  Discussed that the nerve can be impinged within the elbow and shoulder as well.  Recommend symptomatic treatment with over-the-counter acetaminophen and naproxen as needed.  Will refer to orthopedics for further evaluation and possible EMGs.  She is content with the plan.  - XR Wrist Right G/E 3 Views  - Orthopedic  Referral        Subjective:     Jeimy is a 41 year old female presenting to the clinic for concerns for right wrist pain.  Patient states yesterday afternoon while working at a computer, she developed severe right wrist pain.  Pain is a constant dull throb which radiates to her right shoulder.  There is an intermittent sharp pain within the wrist that occurs with any sort of movement.  She has difficulty gripping objects and turning the door handle.  She has numbness and tingling within her fourth and fifth fingers.  She has been taking naproxen with minimal relief.    Reviewof Systems: A complete 14 point review of systems was obtained and is negative or as stated in the history of present illness.    Social History     Socioeconomic History     Marital status:      Spouse name: Not on file     Number of children: Not on file     Years of education: Not on file     Highest education level: Not on file   Occupational History     Not on file   Tobacco Use     Smoking status: Never Smoker     Smokeless tobacco: Never Used   Substance and Sexual Activity     Alcohol use: Not on file     Drug use: Not on file     Sexual activity: Not on file   Other Topics Concern     Not on file   Social History Narrative     Not on file     Social Determinants of Health     Financial Resource Strain:      Difficulty of Paying Living Expenses:     Food Insecurity:      Worried About Running Out of Food in the Last Year:      Ran Out of Food in the Last Year:    Transportation Needs:      Lack of Transportation (Medical):      Lack of Transportation (Non-Medical):    Physical Activity:      Days of Exercise per Week:      Minutes of Exercise per Session:    Stress:      Feeling of Stress :    Social Connections:      Frequency of Communication with Friends and Family:      Frequency of Social Gatherings with Friends and Family:      Attends Temple Services:      Active Member of Clubs or Organizations:      Attends Club or Organization Meetings:      Marital Status:    Intimate Partner Violence:      Fear of Current or Ex-Partner:      Emotionally Abused:      Physically Abused:      Sexually Abused:        Active Ambulatory Problems     Diagnosis Date Noted     Pregnant state, incidental 10/15/2010     Sciatica 10/15/2010     Resolved Ambulatory Problems     Diagnosis Date Noted     No Resolved Ambulatory Problems     No Additional Past Medical History       Family History   Problem Relation Age of Onset     Breast Cancer Mother 56.00     Colon Cancer Father 45.00     Colon Polyps Brother        Objective:     /60 (BP Location: Left arm, Patient Position: Sitting, Cuff Size: Adult Large)   Pulse 74   Wt 92.3 kg (203 lb 8 oz)   SpO2 100%   BMI 33.86 kg/m      Patient is alert, in no obvious distress.   Skin: Warm, dry.   Lungs:  Clear to auscultation. Respirations even and unlabored.  No wheezing or rales noted.   Heart:  Regular rate and rhythm.  No murmurs.   Musculoskeletal:  Full ROM of extremities.  She is tender to palpation of her mid wrist.  She has numbness and tingling within her fourth and fifth fingers.  There is slight tenderness to palpation of the lateral epicondyle of the right elbow.  Tinel's and Phalen's are negative.    LABORATORY: I ordered and personally reviewed right wrist x-rays showing no obvious fracture.  Will have  radiology review.

## 2021-09-22 ENCOUNTER — TRANSFERRED RECORDS (OUTPATIENT)
Dept: HEALTH INFORMATION MANAGEMENT | Facility: CLINIC | Age: 41
End: 2021-09-22

## 2021-12-15 ENCOUNTER — IMMUNIZATION (OUTPATIENT)
Dept: NURSING | Facility: CLINIC | Age: 41
End: 2021-12-15
Payer: COMMERCIAL

## 2021-12-15 PROCEDURE — 91300 PR COVID VAC PFIZER DIL RECON 30 MCG/0.3 ML IM: CPT

## 2021-12-15 PROCEDURE — 0004A PR COVID VAC PFIZER DIL RECON 30 MCG/0.3 ML IM: CPT

## 2021-12-22 ENCOUNTER — E-VISIT (OUTPATIENT)
Dept: URGENT CARE | Facility: URGENT CARE | Age: 41
End: 2021-12-22
Payer: COMMERCIAL

## 2021-12-22 DIAGNOSIS — Z20.822 CLOSE EXPOSURE TO 2019 NOVEL CORONAVIRUS: Primary | ICD-10-CM

## 2021-12-22 PROCEDURE — 99421 OL DIG E/M SVC 5-10 MIN: CPT | Performed by: PHYSICIAN ASSISTANT

## 2021-12-22 NOTE — PATIENT INSTRUCTIONS
"  Dear Jiemy Lewis,    Based on your exposure to COVID-19 (coronavirus), we would like to test you for this virus. I have placed an order for this test.The best time for testing is 5-7 days after the exposure. I would wait at least 3 days before testing.     How to schedule:  Go to your [employer] home page and scroll down to the section that says  You have an appointment that needs to be scheduled  and click the large green button that says  Schedule Now  and follow the steps to find the next available opening.     If you are unable to complete these [employer] scheduling steps, please call [phone] to schedule your testing.     Return to work/school/ guidance:   For people with high risk exposures outside the home    Please let your workplace manager and staffing office know when your quarantine ends.     We can not give you an exact date as it depends on the information below. You can calculate this on your own or work with your manager/staffing office to calculate this. (For example if you were exposed on 10/4, you would have to quarantine for 14 full days. That would be through 10/18. You could return on 10/19.)    Quarantine Guidelines:  Patients (\"contacts\") who have been in close prolonged contact of an infected person(s) (within six feet for at least 15 minutes within a 24 hour period), and remain asymptomatic should enter quarantine based on the following options:    14-day quarantine period (this remains the CDC recommendation for the greatest protection against spread of COVID-19) OR    Minimum 7-day quarantine with negative RT-PCR test collected on day 5 or later OR    10-day quarantine with no test  Quarantine Guideline exceptions are as follows:    People who have been fully vaccinated do not need to quarantine if the exposure was at least 2 weeks after the last vaccination. This includes vaccinated health care workers.    Not fully vaccinated and unvaccinated Individuals who work in health care, " congregate care, or congregate living should be off work for 14 days from their last date of exposure. Community activities for this group can be resumed based on options above. Fully vaccinated individuals in this group do not need to quarantine from work after exposure.    Not fully vaccinated and unvaccinated people whose high-risk exposure was a household member should always quarantine for 14 days from their last date of exposure. Fully vaccinated people in this category do not need to quarantine.    Not fully vaccinated or unvaccinated residents of congregate care and congregate living settings should always quarantine for 14 days from their last date of exposure. Fully vaccinated residents do not need to quarantine.  Note: If you have ongoing exposure to the covid positive person, this quarantine period may be more than 14 days. (For example, if you are continued to be exposed to your child who tested positive and cannot isolate from them, then the quarantine of 7-14 days can't start until your child is no longer contagious. This is typically 10 days from onset of the child's symptoms. So the total duration may be 17-24 days in this case.)    You should continue symptom monitoring until day 14 post-exposure. If you develop signs or symptoms of COVID-19, isolate and get tested (even if you have been tested already).    How to quarantine:   Stay home and away from others. Don't go to school or anywhere else. Generally quarantine means staying home from work but there are some exceptions to this. Please contact your workplace.  No hugging, kissing or shaking hands.  Don't let anyone visit.  Cover your mouth and nose with a mask, tissue or washcloth to avoid spreading germs.  Wash your hands and face often. Use soap and water.    What are the symptoms of COVID-19?  The most common symptoms are cough, fever and trouble breathing. Less common symptoms include headache, body aches, fatigue (feeling very tired),  chills, sore throat, stuffy or runny nose, diarrhea (loose poop), loss of taste or smell, belly pain, and nausea or vomiting (feeling sick to your stomach or throwing up).  After 14 days, if you have still don't have symptoms, you likely don't have this virus.  If you develop symptoms, follow these guidelines.  If you're normally healthy: Please start another eVisit.  If you have a serious health problem (like cancer, heart failure, an organ transplant or kidney disease): Call your specialty clinic. Let them know that you might have COVID-19.    Where can I get more information?   [facility] [place] - About COVID-19: [email or web address]  CDC - What to Do If You're Sick: www.cdc.gov/coronavirus/2019-ncov/about/steps-when-sick.html  CDC - Ending Home Isolation: www.cdc.gov/coronavirus/2019-ncov/hcp/disposition-in-home-patients.html  CDC - Caring for Someone: www.cdc.gov/coronavirus/2019-ncov/if-you-are-sick/care-for-someone.html  [facility] clinical trials (COVID-19 research studies): clinicalaffairs.[facility]-clinical-trials  Below are the COVID-19 hotlines at the [facility] of Health ([facility]). Interpreters are available.  For health questions: Call 187-645-9089 or 1-169.567.2053 (7 a.m. to 7 p.m.)  For questions about schools and childcare: Call 411-971-4536 or 1-636.928.2024 (7 a.m. to 7 p.m.)

## 2021-12-24 ENCOUNTER — LAB (OUTPATIENT)
Dept: LAB | Facility: CLINIC | Age: 41
End: 2021-12-24
Attending: PHYSICIAN ASSISTANT
Payer: COMMERCIAL

## 2021-12-24 DIAGNOSIS — Z20.822 CLOSE EXPOSURE TO 2019 NOVEL CORONAVIRUS: ICD-10-CM

## 2021-12-24 LAB — SARS-COV-2 RNA RESP QL NAA+PROBE: NEGATIVE

## 2021-12-24 PROCEDURE — U0003 INFECTIOUS AGENT DETECTION BY NUCLEIC ACID (DNA OR RNA); SEVERE ACUTE RESPIRATORY SYNDROME CORONAVIRUS 2 (SARS-COV-2) (CORONAVIRUS DISEASE [COVID-19]), AMPLIFIED PROBE TECHNIQUE, MAKING USE OF HIGH THROUGHPUT TECHNOLOGIES AS DESCRIBED BY CMS-2020-01-R: HCPCS

## 2021-12-24 PROCEDURE — U0005 INFEC AGEN DETEC AMPLI PROBE: HCPCS

## 2021-12-25 ENCOUNTER — E-VISIT (OUTPATIENT)
Dept: URGENT CARE | Facility: CLINIC | Age: 41
End: 2021-12-25
Payer: COMMERCIAL

## 2021-12-25 DIAGNOSIS — Z20.822 CLOSE EXPOSURE TO 2019 NOVEL CORONAVIRUS: Primary | ICD-10-CM

## 2021-12-25 PROCEDURE — 99421 OL DIG E/M SVC 5-10 MIN: CPT | Performed by: PHYSICIAN ASSISTANT

## 2021-12-25 NOTE — PATIENT INSTRUCTIONS
"  Dear Jeimy Lewis,    Based on your exposure to COVID-19 (coronavirus), we would like to test you for this virus. I have placed an order for this test.The best time for testing is 5-7 days after the exposure.    How to schedule:  Go to your [facility] home page and scroll down to the section that says  You have an appointment that needs to be scheduled  and click the large green button that says  Schedule Now  and follow the steps to find the next available opening.     If you are unable to complete these [facility] scheduling steps, please call [phone] to schedule your testing.     Return to work/school/ guidance:   For people with high risk exposures outside the home    Please let your workplace manager and staffing office know when your quarantine ends.     We can not give you an exact date as it depends on the information below. You can calculate this on your own or work with your manager/staffing office to calculate this. (For example if you were exposed on 10/4, you would have to quarantine for 14 full days. That would be through 10/18. You could return on 10/19.)    Quarantine Guidelines:  Patients (\"contacts\") who have been in close prolonged contact of an infected person(s) (within six feet for at least 15 minutes within a 24 hour period), and remain asymptomatic should enter quarantine based on the following options:    14-day quarantine period (this remains the CDC recommendation for the greatest protection against spread of COVID-19) OR    Minimum 7-day quarantine with negative RT-PCR test collected on day 5 or later OR    10-day quarantine with no test  Quarantine Guideline exceptions are as follows:    People who have been fully vaccinated do not need to quarantine if the exposure was at least 2 weeks after the last vaccination. This includes vaccinated health care workers.    Not fully vaccinated and unvaccinated Individuals who work in health care, congregate care, or congregate living should " be off work for 14 days from their last date of exposure. Community activities for this group can be resumed based on options above. Fully vaccinated individuals in this group do not need to quarantine from work after exposure.    Not fully vaccinated and unvaccinated people whose high-risk exposure was a household member should always quarantine for 14 days from their last date of exposure. Fully vaccinated people in this category do not need to quarantine.    Not fully vaccinated or unvaccinated residents of congregate care and congregate living settings should always quarantine for 14 days from their last date of exposure. Fully vaccinated residents do not need to quarantine.  Note: If you have ongoing exposure to the covid positive person, this quarantine period may be more than 14 days. (For example, if you are continued to be exposed to your child who tested positive and cannot isolate from them, then the quarantine of 7-14 days can't start until your child is no longer contagious. This is typically 10 days from onset of the child's symptoms. So the total duration may be 17-24 days in this case.)    You should continue symptom monitoring until day 14 post-exposure. If you develop signs or symptoms of COVID-19, isolate and get tested (even if you have been tested already).    How to quarantine:   Stay home and away from others. Don't go to school or anywhere else. Generally quarantine means staying home from work but there are some exceptions to this. Please contact your workplace.  No hugging, kissing or shaking hands.  Don't let anyone visit.  Cover your mouth and nose with a mask, tissue or washcloth to avoid spreading germs.  Wash your hands and face often. Use soap and water.    What are the symptoms of COVID-19?  The most common symptoms are cough, fever and trouble breathing. Less common symptoms include headache, body aches, fatigue (feeling very tired), chills, sore throat, stuffy or runny nose,  diarrhea (loose poop), loss of taste or smell, belly pain, and nausea or vomiting (feeling sick to your stomach or throwing up).  After 14 days, if you have still don't have symptoms, you likely don't have this virus.  If you develop symptoms, follow these guidelines.  If you're normally healthy: Please start another eVisit.  If you have a serious health problem (like cancer, heart failure, an organ transplant or kidney disease): Call your specialty clinic. Let them know that you might have COVID-19.    Where can I get more information?  [facility] - About COVID-19: [email or web address]  CDC - What to Do If You're Sick: www.cdc.gov/coronavirus/2019-ncov/about/steps-when-sick.html  CDC - Ending Home Isolation: www.cdc.gov/coronavirus/2019-ncov/hcp/disposition-in-home-patients.html  CDC - Caring for Someone: www.cdc.gov/coronavirus/2019-ncov/if-you-are-sick/care-for-someone.html  [facility] clinical trials (COVID-19 research studies): clinicalaffairs.[facility]-clinical-trials  Below are the COVID-19 hotlines at the [facility] of [facility] ([facility]). Interpreters are available.  For health questions: Call 025-891-0594 or 1-217.790.5926 (7 a.m. to 7 p.m.)  For questions about schools and childcare: Call 651-429-3623 or 1-139.611.4211 (7 a.m. to 7 p.m.)        December 25, 2021  RE:  Jeimy Lewis                                                                                                                   [address]  [place] [address]      To whom it may concern:    I evaluated Jeimy Lewis on December 25, 2021. Jeimy MARLYN Ochoak should be excused from work/school.    They should let their workplace manager and staffing office know when their quarantine ends.    We can not give an exact date as it depends on the information below. They can calculate this on their own or work with their manager/staffing office to calculate this. (For example if they were exposed on 10/04,  "they would have to quarantine for 14 full days. That would be through 10/18. They could return on 10/19.)    Quarantine Guidelines:    Patients (\"contacts\") who have been in close prolonged contact of an infected person(s) (within six feet for at least 15 minutes within a 24 hour period) and remain asymptomatic should enter quarantine based on the following options:      14-day quarantine period (this remains the CDC recommendation for the greatest protection against spread of COVID-19) OR    Minimum 7-day quarantine with negative RT-PCR test collected on day 5 or later OR    10-day quarantine with no test   Quarantine Guideline exceptions are as follows:    People who have been fully vaccinated do not need to quarantine if the exposure was at least 2 weeks after the last vaccination. This includes vaccinated health care workers.    Not fully vaccinated and unvaccinated Individuals who work in health care, congregate care, or congregate living should be off work for 14 days from their last date of exposure. Community activities for this group can be resumed based on options above. Fully vaccinated individuals in this group do not need to quarantine from work after exposure.    Not fully vaccinated and unvaccinated people whose high-risk exposure was a household member should always quarantine for 14 days from their last date of exposure. Fully vaccinated people in this category do not need to quarantine.    Not fully vaccinated or unvaccinated residents of congregate care and congregate living settings should always quarantine for 14 days from their last date of exposure. Fully vaccinated residents do not need to quarantine.    Note: If there is ongoing exposure to the covid positive person, this quarantine period may be longer than 14 days. (For example, if they are continually exposed to their child, who tested positive and cannot isolate from them, then the quarantine of 7-14 days can't start until their child is " no longer contagious. This is typically 10 days from onset to the child's symptoms. So the total duration may be 17-24 days in this case.)    Jeimy Lewis should continue symptom monitoring until day 14 post-exposure. If they develop signs or symptoms of COVID-19, they should isolate and get tested (even if they have been tested already).    Sincerely,  Jersey Hdz PA-C

## 2021-12-27 ENCOUNTER — LAB (OUTPATIENT)
Dept: LAB | Facility: CLINIC | Age: 41
End: 2021-12-27
Attending: PHYSICIAN ASSISTANT
Payer: COMMERCIAL

## 2021-12-27 DIAGNOSIS — Z20.822 CLOSE EXPOSURE TO 2019 NOVEL CORONAVIRUS: ICD-10-CM

## 2021-12-27 PROCEDURE — U0005 INFEC AGEN DETEC AMPLI PROBE: HCPCS

## 2021-12-27 PROCEDURE — U0003 INFECTIOUS AGENT DETECTION BY NUCLEIC ACID (DNA OR RNA); SEVERE ACUTE RESPIRATORY SYNDROME CORONAVIRUS 2 (SARS-COV-2) (CORONAVIRUS DISEASE [COVID-19]), AMPLIFIED PROBE TECHNIQUE, MAKING USE OF HIGH THROUGHPUT TECHNOLOGIES AS DESCRIBED BY CMS-2020-01-R: HCPCS

## 2021-12-28 LAB — SARS-COV-2 RNA RESP QL NAA+PROBE: NEGATIVE

## 2022-01-04 ENCOUNTER — ANCILLARY PROCEDURE (OUTPATIENT)
Dept: MAMMOGRAPHY | Facility: CLINIC | Age: 42
End: 2022-01-04
Attending: FAMILY MEDICINE
Payer: COMMERCIAL

## 2022-01-04 DIAGNOSIS — Z12.31 VISIT FOR SCREENING MAMMOGRAM: ICD-10-CM

## 2022-01-04 PROCEDURE — 77063 BREAST TOMOSYNTHESIS BI: CPT | Mod: TC | Performed by: RADIOLOGY

## 2022-01-04 PROCEDURE — 77067 SCR MAMMO BI INCL CAD: CPT | Mod: TC | Performed by: RADIOLOGY

## 2022-01-11 ENCOUNTER — ANCILLARY PROCEDURE (OUTPATIENT)
Dept: MAMMOGRAPHY | Facility: CLINIC | Age: 42
End: 2022-01-11
Attending: FAMILY MEDICINE
Payer: COMMERCIAL

## 2022-01-11 DIAGNOSIS — R92.8 ABNORMAL MAMMOGRAM: ICD-10-CM

## 2022-01-11 PROCEDURE — 77061 BREAST TOMOSYNTHESIS UNI: CPT | Mod: RT

## 2022-01-14 ENCOUNTER — LAB (OUTPATIENT)
Dept: LAB | Facility: CLINIC | Age: 42
End: 2022-01-14
Attending: FAMILY MEDICINE
Payer: COMMERCIAL

## 2022-01-14 DIAGNOSIS — Z20.822 CLOSE EXPOSURE TO 2019 NOVEL CORONAVIRUS: ICD-10-CM

## 2022-01-14 PROCEDURE — U0005 INFEC AGEN DETEC AMPLI PROBE: HCPCS

## 2022-01-14 PROCEDURE — U0003 INFECTIOUS AGENT DETECTION BY NUCLEIC ACID (DNA OR RNA); SEVERE ACUTE RESPIRATORY SYNDROME CORONAVIRUS 2 (SARS-COV-2) (CORONAVIRUS DISEASE [COVID-19]), AMPLIFIED PROBE TECHNIQUE, MAKING USE OF HIGH THROUGHPUT TECHNOLOGIES AS DESCRIBED BY CMS-2020-01-R: HCPCS

## 2022-01-15 LAB — SARS-COV-2 RNA RESP QL NAA+PROBE: NEGATIVE

## 2022-02-01 ENCOUNTER — OFFICE VISIT (OUTPATIENT)
Dept: FAMILY MEDICINE | Facility: CLINIC | Age: 42
End: 2022-02-01
Payer: COMMERCIAL

## 2022-02-01 VITALS
TEMPERATURE: 97.9 F | BODY MASS INDEX: 34.19 KG/M2 | RESPIRATION RATE: 16 BRPM | SYSTOLIC BLOOD PRESSURE: 112 MMHG | DIASTOLIC BLOOD PRESSURE: 69 MMHG | OXYGEN SATURATION: 96 % | WEIGHT: 205.2 LBS | HEIGHT: 65 IN | HEART RATE: 77 BPM

## 2022-02-01 DIAGNOSIS — Z80.0 FAMILY HISTORY OF COLON CANCER IN FATHER: ICD-10-CM

## 2022-02-01 DIAGNOSIS — Z30.433 ENCOUNTER FOR REMOVAL AND REINSERTION OF INTRAUTERINE CONTRACEPTIVE DEVICE: ICD-10-CM

## 2022-02-01 DIAGNOSIS — Z00.00 ROUTINE GENERAL MEDICAL EXAMINATION AT A HEALTH CARE FACILITY: Primary | ICD-10-CM

## 2022-02-01 DIAGNOSIS — Z80.3 FAMILY HISTORY OF BREAST CANCER IN MOTHER: ICD-10-CM

## 2022-02-01 DIAGNOSIS — Z30.430 ENCOUNTER FOR IUD INSERTION: ICD-10-CM

## 2022-02-01 DIAGNOSIS — L50.9 URTICARIA: ICD-10-CM

## 2022-02-01 PROBLEM — M77.30 CALCANEAL SPUR: Status: ACTIVE | Noted: 2020-07-08

## 2022-02-01 PROBLEM — Z97.5 IUD (INTRAUTERINE DEVICE) IN PLACE: Status: ACTIVE | Noted: 2020-07-08

## 2022-02-01 PROBLEM — M79.671 PAIN OF RIGHT HEEL: Status: ACTIVE | Noted: 2020-07-08

## 2022-02-01 PROCEDURE — 99396 PREV VISIT EST AGE 40-64: CPT | Mod: 25 | Performed by: FAMILY MEDICINE

## 2022-02-01 PROCEDURE — 58300 INSERT INTRAUTERINE DEVICE: CPT | Performed by: FAMILY MEDICINE

## 2022-02-01 PROCEDURE — 58301 REMOVE INTRAUTERINE DEVICE: CPT | Performed by: FAMILY MEDICINE

## 2022-02-01 ASSESSMENT — MIFFLIN-ST. JEOR: SCORE: 1601.66

## 2022-02-01 NOTE — PROGRESS NOTES
SUBJECTIVE:   CC: Jeimy Lewis is an 41 year old woman who presents for preventive health visit.        Patient has been advised of split billing requirements and indicates understanding: Yes  Healthy Habits:     Getting at least 3 servings of Calcium per day:  NO    Bi-annual eye exam:  Yes    Dental care twice a year:  NO    Sleep apnea or symptoms of sleep apnea:  None    Diet:  Regular (no restrictions)    Frequency of exercise:  None    Taking medications regularly:  Yes    Medication side effects:  Not applicable    PHQ-2 Total Score: 0    Additional concerns today:  Yes  family hx breast cancer in mother- ducts- no genetic testing. Maybe something on dads side. Mammogram- call back- normal/   Colonoscopy last year normal- every 5 years for dads colon cancer.   Claritin helps 2 twice daily.   Episodes of hives- overly hot or cold will get pin prick hives- mast cell -overproduce to allergist. singulair didn't help   Saw allergist. Body over reacts to allergens. Not sure what it is . Cant tell. Mold ragweed    Every once in awhile pressure in left ear -wants looked at.     Wt Readings from Last 3 Encounters:   02/01/22 93.1 kg (205 lb 3.2 oz)   09/21/21 92.3 kg (203 lb 8 oz)   07/27/21 91.2 kg (201 lb)                   Today's PHQ-2 Score:   PHQ-2 ( 1999 Pfizer) 1/27/2022   Q1: Little interest or pleasure in doing things 0   Q2: Feeling down, depressed or hopeless 0   PHQ-2 Score 0   PHQ-2 Total Score (12-17 Years)- Positive if 3 or more points; Administer PHQ-A if positive -   Q1: Little interest or pleasure in doing things Not at all   Q2: Feeling down, depressed or hopeless Not at all   PHQ-2 Score 0       Abuse: Current or Past (Physical, Sexual or Emotional) - No  Do you feel safe in your environment? Yes    Have you ever done Advance Care Planning? (For example, a Health Directive, POLST, or a discussion with a medical provider or your loved ones about your wishes): No, advance care planning information  given to patient to review.  Patient plans to discuss their wishes with loved ones or provider.      Social History     Tobacco Use     Smoking status: Never Smoker     Smokeless tobacco: Never Used   Substance Use Topics     Alcohol use: Yes     Comment: I have a few glasses of wine or beer on the weekend.         Alcohol Use 1/27/2022   Prescreen: >3 drinks/day or >7 drinks/week? No       Reviewed orders with patient.  Reviewed health maintenance and updated orders accordingly - Yes  Lab work is in process  BP Readings from Last 3 Encounters:   02/01/22 112/69   09/21/21 100/60   07/27/21 102/69    Wt Readings from Last 3 Encounters:   02/01/22 93.1 kg (205 lb 3.2 oz)   09/21/21 92.3 kg (203 lb 8 oz)   07/27/21 91.2 kg (201 lb)                  Current Outpatient Medications   Medication Sig Dispense Refill     cholecalciferol, vitamin D3, 1,000 unit (25 mcg) tablet Take 1,000 Units by mouth daily        EPINEPHrine (EPIPEN/ADRENACLICK/AUVI-Q) 0.3 mg/0.3 mL injection [EPINEPHRINE (EPIPEN/ADRENACLICK/AUVI-Q) 0.3 MG/0.3 ML INJECTION] Inject 0.3 mL (0.3 mg total) as directed as needed for anaphylaxis. Inject into thigh. 4 0     fluticasone (FLONASE) 50 mcg/actuation nasal spray [FLUTICASONE (FLONASE) 50 MCG/ACTUATION NASAL SPRAY] Apply 1 spray into each nostril daily.       levonorgestrel (MIRENA) 20 mcg/24 hr (5 years) IUD [LEVONORGESTREL (MIRENA) 20 MCG/24 HR (5 YEARS) IUD] 1 each by Intrauterine route once.       levonorgestrel (MIRENA) 20 MCG/24HR IUD 1 each (20 mcg) by Intrauterine route once       loratadine (CLARITIN) 10 mg tablet Take 10 mg by mouth daily 20mg in am and 20mg in pm       misoprostol (CYTOTEC) 200 MCG tablet Take 1 tablet by mouth 24 hours in advance and 1 tablet 12 hours in advance of procedure 2 tablet 0     multivitamin therapeutic tablet [MULTIVITAMIN THERAPEUTIC TABLET] Take 1 tablet by mouth daily.       naproxen (NAPROSYN) 500 MG tablet [NAPROXEN (NAPROSYN) 500 MG TABLET] TAKE 1  TABLET(500 MG) BY MOUTH TWICE DAILY AS NEEDED FOR PAIN 60 tablet 2     Allergies   Allergen Reactions     Alternaria Alternata Anaphylaxis, Diarrhea, GI Disturbance, Hives, Itching, Other (See Comments) and Swelling     Marshelder/Poverty (Weed) Difficulty breathing and Itching     Short Ragweed Pollen Ext Cough, Difficulty breathing and Itching       Breast Cancer Screening:    FHS-7:   Breast CA Risk Assessment (FHS-7) 1/4/2022 1/27/2022   Did any of your first-degree relatives have breast or ovarian cancer? Yes Yes   Did any of your relatives have bilateral breast cancer? No No   Did any man in your family have breast cancer? No No   Did any woman in your family have breast and ovarian cancer? No No   Did any woman in your family have breast cancer before age 50 y? No No   Do you have 2 or more relatives with breast and/or ovarian cancer? No No   Do you have 2 or more relatives with breast and/or bowel cancer? No No       Mammogram Screening - Offered annual screening and updated Health Maintenance for mutual plan based on risk factor consideration    Pertinent mammograms are reviewed under the imaging tab.    History of abnormal Pap smear: NO - age 30-65 PAP every 5 years with negative HPV co-testing recommended  PAP / HPV Latest Ref Rng & Units 2/15/2021   PAP Negative for squamous intraepithelial lesion or malignancy. Negative for squamous intraepithelial lesion or malignancy  Electronically signed by Jillian Lewis CT (ASCP) on 2/22/2021 at  3:01 PM     HPV16 NEG Negative   HPV18 NEG Negative   HRHPV NEG Negative     Reviewed and updated as needed this visit by clinical staff    Meds             Reviewed and updated as needed this visit by Provider               No past medical history on file.   Past Surgical History:   Procedure Laterality Date     APPENDECTOMY  1988     BIOPSY  1996ish?    cyst removed behind left ear     COLONOSCOPY  3/8/2021    normal     ENT SURGERY  2002    septoplasty  "      Review of Systems  CONSTITUTIONAL: NEGATIVE for fever, chills, change in weight  INTEGUMENTARY/SKIN: NEGATIVE for worrisome rashes, moles or lesions  EYES: NEGATIVE for vision changes or irritation  ENT: NEGATIVE for ear, mouth and throat problems  RESP: NEGATIVE for significant cough or SOB  BREAST: NEGATIVE for masses, tenderness or discharge  CV: NEGATIVE for chest pain, palpitations or peripheral edema  GI: NEGATIVE for nausea, abdominal pain, heartburn, or change in bowel habits  : NEGATIVE for unusual urinary or vaginal symptoms. No vaginal bleeding.  MUSCULOSKELETAL: NEGATIVE for significant arthralgias or myalgia  NEURO: NEGATIVE for weakness, dizziness or paresthesias  ENDOCRINE: NEGATIVE for temperature intolerance, skin/hair changes  HEME/ALLERGY/IMMUNE: NEGATIVE for bleeding problems  PSYCHIATRIC: NEGATIVE for changes in mood or affect      OBJECTIVE:   /69   Pulse 77   Temp 97.9  F (36.6  C) (Oral)   Resp 16   Ht 1.651 m (5' 5\")   Wt 93.1 kg (205 lb 3.2 oz)   SpO2 96%   BMI 34.15 kg/m    Physical Exam  GENERAL: healthy, alert and no distress  EYES: Eyes grossly normal to inspection, PERRL and conjunctivae and sclerae normal  HENT: ear canals and TM's normal, nose and mouth without ulcers or lesions  NECK: no adenopathy, no asymmetry, masses, or scars and thyroid normal to palpation  RESP: lungs clear to auscultation - no rales, rhonchi or wheezes  BREAST: normal without masses, tenderness or nipple discharge and no palpable axillary masses or adenopathy  CV: regular rate and rhythm, normal S1 S2, no S3 or S4, no murmur, click or rub, no peripheral edema and peripheral pulses strong  ABDOMEN: soft, nontender, no hepatosplenomegaly, no masses and bowel sounds normal   (female): normal female external genitalia, normal urethral meatus, vaginal mucosa pink, moist, well rugated, and normal cervix/adnexa/uterus without masses or discharge  MS: no gross musculoskeletal defects noted, no " "edema  SKIN: no suspicious lesions or rashes  NEURO: Normal strength and tone, mentation intact and speech normal  PSYCH: mentation appears normal, affect normal/bright  LYMPH: no cervical, supraclavicular, axillary, or inguinal adenopathy    Diagnostic Test Results:  Labs reviewed in Epic    ASSESSMENT/PLAN:   Diagnoses and all orders for this visit:  Routine general medical examination at a health care facility-reviewed fasting labs from last year and labs from allergist.  Continue with Claritin twice daily for idiopathic urticaria.  Family history of breast cancer in mother-repeat annual mammogram  Family history of colon cancer in father-repeat colonoscopy in 2026     Encounter for removal and reinsertion of intrauterine contraceptive device-discussed follow-up as needed.  -     levonorgestrel (MIRENA) 20 MCG/24HR IUD 20 mcg  -     INSERTION INTRAUTERINE DEVICE  -     REMOVE INTRAUTERINE DEVICE  Other orders  -     REVIEW OF HEALTH MAINTENANCE PROTOCOL ORDERS      Patient has been advised of split billing requirements and indicates understanding: Yes    COUNSELING:  Reviewed preventive health counseling, as reflected in patient instructions       Regular exercise       Healthy diet/nutrition       Contraception    Estimated body mass index is 34.15 kg/m  as calculated from the following:    Height as of this encounter: 1.651 m (5' 5\").    Weight as of this encounter: 93.1 kg (205 lb 3.2 oz).    Weight management plan: Discussed healthy diet and exercise guidelines    She reports that she has never smoked. She has never used smokeless tobacco.      Counseling Resources:  ATP IV Guidelines  Pooled Cohorts Equation Calculator  Breast Cancer Risk Calculator  BRCA-Related Cancer Risk Assessment: FHS-7 Tool  FRAX Risk Assessment  ICSI Preventive Guidelines  Dietary Guidelines for Americans, 2010  USDA's MyPlate  ASA Prophylaxis  Lung CA Screening    Kirti Delgado  [facility] " [place]      SUBJECTIVE:    Is a pregnancy test required: No.  Was a consent obtained?  Yes verbal.  All risks benefits were discussed with patient    Subjective: Jeimy Lewis is a 41 year old  presents for IUD and desires Mirena type IUD.  She requests removal of the IUD because the IUD effectiveness has     Patient has been given the opportunity to ask questions about all forms of birth control, including all options appropriate for Jeimy Lewis. Discussed that no method of birth control, except abstinence is 100% effective against pregnancy or sexually transmitted infection.     Jeiym Lewis understands she may have the IUD removed at any time. IUD should be removed by a health care provider and the current IUD will be removed today.    The entire removal and insertion procedure was reviewed with the patient, including care after placement.    Today's PHQ-2 Score:   PHQ-2 (  Pfizer) 2022   Q1: Little interest or pleasure in doing things 0   Q2: Feeling down, depressed or hopeless 0   PHQ-2 Score 0   PHQ-2 Total Score (12-17 Years)- Positive if 3 or more points; Administer PHQ-A if positive -   Q1: Little interest or pleasure in doing things Not at all   Q2: Feeling down, depressed or hopeless Not at all   PHQ-2 Score 0       PROCEDURE:    Premedicated with ibuprofen. Premedicated with cytotec.  A speculum exam was performed and the cervix was visualized. The IUD string was visualized. Using ring forceps, the string  was grasped and the IUD removed intact.    Under sterile technique, cervix was visualized with speculum and prepped with Betadine solution swab x 3. Tenaculum was placed for stability. The uterus was gently straightened and sounded to 8.5 cm. IUD prepared for placement, and IUD inserted according to 's instructions without difficulty or significant ressitance, and deployed at the fundus. The strings were visualized and trimmed to 4.5 cm from the external os. Tenaculum  was removed and hemostasis noted. Speculum removed.  Patient tolerated procedure well.    2/1/22 Mirena [id number]  Expiration 12/23    EBL: minimal    Complications: none      POST PROCEDURE:    Given 's handouts, including when to have IUD removed, list of danger s/sx, side effects and follow up recommended. Encouraged condom use for prevention of STD. Advised to call for any fever, for prolonged or severe pain or bleeding, abnormal vaginal dischage, or unable to palpate strings. She was advised to use pain medications (ibuprofen) as needed for mild to moderate pain. Advised to follow-up in clinic in 4-6 weeks for IUD string check if unable to find strings or as directed by provider.     Kirti Delgado MD

## 2022-03-07 ENCOUNTER — TRANSFERRED RECORDS (OUTPATIENT)
Dept: HEALTH INFORMATION MANAGEMENT | Facility: CLINIC | Age: 42
End: 2022-03-07
Payer: COMMERCIAL

## 2022-03-07 DIAGNOSIS — M70.61 TROCHANTERIC BURSITIS OF BOTH HIPS: ICD-10-CM

## 2022-03-07 DIAGNOSIS — M70.62 TROCHANTERIC BURSITIS OF BOTH HIPS: ICD-10-CM

## 2022-03-08 RX ORDER — NAPROXEN 500 MG/1
TABLET ORAL
Qty: 60 TABLET | Refills: 0 | Status: SHIPPED | OUTPATIENT
Start: 2022-03-08 | End: 2023-02-09

## 2022-03-08 NOTE — TELEPHONE ENCOUNTER
"Last Written Prescription Date:  2/5/21  Last Fill Quantity: 60,  # refills: 2   Last office visit provider:  2/1/22     Requested Prescriptions   Pending Prescriptions Disp Refills     naproxen (NAPROSYN) 500 MG tablet [Pharmacy Med Name: NAPROXEN 500MG TABLETS] 60 tablet 2     Sig: TAKE 1 TABLET(500 MG) BY MOUTH TWICE DAILY AS NEEDED FOR PAIN       NSAID Medications Passed - 3/8/2022  1:55 PM        Passed - Blood pressure under 140/90 in past 12 months     BP Readings from Last 3 Encounters:   02/01/22 112/69   09/21/21 100/60   07/27/21 102/69                 Passed - Normal ALT on file in past 12 months     Recent Labs   Lab Test 03/16/21  1653   ALT 14             Passed - Normal AST on file in past 12 months     Recent Labs   Lab Test 03/16/21  1653   AST 14             Passed - Recent (12 mo) or future (30 days) visit within the authorizing provider's specialty     Patient has had an office visit with the authorizing provider or a provider within the authorizing providers department within the previous 12 mos or has a future within next 30 days. See \"Patient Info\" tab in inbasket, or \"Choose Columns\" in Meds & Orders section of the refill encounter.              Passed - Patient is age 6-64 years        Passed - Normal CBC on file in past 12 months     Recent Labs   Lab Test 03/16/21  1653   WBC 9.1   RBC 4.80   HGB 14.1   HCT 41.7                    Passed - Medication is active on med list        Passed - No active pregnancy on record        Passed - Normal serum creatinine on file in past 12 months     Recent Labs   Lab Test 03/16/21  1653   CR 0.77       Ok to refill medication if creatinine is low          Passed - No positive pregnancy test in past 12 months             Og Tubbs, RN 03/08/22 1:55 PM  "

## 2022-03-18 ENCOUNTER — TRANSFERRED RECORDS (OUTPATIENT)
Dept: HEALTH INFORMATION MANAGEMENT | Facility: CLINIC | Age: 42
End: 2022-03-18
Payer: COMMERCIAL

## 2022-03-21 ENCOUNTER — TRANSFERRED RECORDS (OUTPATIENT)
Dept: HEALTH INFORMATION MANAGEMENT | Facility: CLINIC | Age: 42
End: 2022-03-21
Payer: COMMERCIAL

## 2022-04-01 ENCOUNTER — TRANSFERRED RECORDS (OUTPATIENT)
Dept: HEALTH INFORMATION MANAGEMENT | Facility: CLINIC | Age: 42
End: 2022-04-01
Payer: COMMERCIAL

## 2022-04-19 ENCOUNTER — TRANSFERRED RECORDS (OUTPATIENT)
Dept: HEALTH INFORMATION MANAGEMENT | Facility: CLINIC | Age: 42
End: 2022-04-19
Payer: COMMERCIAL

## 2022-09-27 ENCOUNTER — OFFICE VISIT (OUTPATIENT)
Dept: ALLERGY | Facility: CLINIC | Age: 42
End: 2022-09-27
Payer: COMMERCIAL

## 2022-09-27 VITALS — RESPIRATION RATE: 16 BRPM | HEART RATE: 93 BPM | OXYGEN SATURATION: 100 %

## 2022-09-27 DIAGNOSIS — L50.1 CHRONIC IDIOPATHIC URTICARIA: Primary | ICD-10-CM

## 2022-09-27 PROCEDURE — 99213 OFFICE O/P EST LOW 20 MIN: CPT | Performed by: ALLERGY & IMMUNOLOGY

## 2022-09-27 NOTE — LETTER
9/27/2022         RE: Jeimy Lewis  [id number]  [place] [address]        Dear Colleague,    Thank you for referring your patient, Jeimy Lewis, to the [facility]. Please see a copy of my visit note below.        Subjective   Jeimy is a 42 year old, presenting for the following health issues:  RECHECK      HPI     Chief complaint: Allergic reaction    History of present illness: This is a pleasant 42-year-old woman with a history of allergies, hives and a history of anaphylaxis here today for follow-up visit.  In June of this year she states that she had a few days of itching and tingling of her lips.  She takes Claritin twice daily.  She states with the fall she is been taking Claritin plus Zyrtec.  She does have a known Alternaria and ragweed allergy.  She states she can think of nothing that triggered the reaction in June.  She states it went away within a few days and so she did not think much of it but she kept today's visit.  She does not sick at that time.  She took no over-the-counter medications.  She does have a prescription for naproxen but states this does not seem to trigger itching.    Review of Systems         Objective    Pulse 93   Resp 16   SpO2 100%   There is no height or weight on file to calculate BMI.  Physical Exam       Gen: Pleasant female not in acute distress  HEENT: Eyes no erythema of the bulbar or palpebral conjunctiva, no edema.   Psych: Alert and appropriate for age    Impression report and plan:  1.  Chronic urticaria and angioedema    I suspect this was an exacerbation of underlying urticaria and angioedema.  If this were to happen again, we talked about using Zyrtec.  She can use up to 2 tabs twice daily of Zyrtec or Claritin.  She will notify me if symptoms not well controlled.  Reviewed exacerbating factors for chronic urticaria and angioedema.  She does have a current epinephrine device and she will continue to carry this given her  history of anaphylaxis.                    Again, thank you for allowing me to participate in the care of your patient.        Sincerely,        Sakshi VILLALPANDO MD

## 2022-09-27 NOTE — PATIENT INSTRUCTIONS
Cetirizine 10 mg --twice daily --2 upon swelling/hives    Okay to take with loratidine twice daily     Chronic hives/swelling

## 2022-09-27 NOTE — PROGRESS NOTES
Subjective   Jeimy is a 42 year old, presenting for the following health issues:  RECHECK      HPI     Chief complaint: Allergic reaction    History of present illness: This is a pleasant 42-year-old woman with a history of allergies, hives and a history of anaphylaxis here today for follow-up visit.  In June of this year she states that she had a few days of itching and tingling of her lips.  She takes Claritin twice daily.  She states with the fall she is been taking Claritin plus Zyrtec.  She does have a known Alternaria and ragweed allergy.  She states she can think of nothing that triggered the reaction in June.  She states it went away within a few days and so she did not think much of it but she kept today's visit.  She does not sick at that time.  She took no over-the-counter medications.  She does have a prescription for naproxen but states this does not seem to trigger itching.    Review of Systems         Objective    Pulse 93   Resp 16   SpO2 100%   There is no height or weight on file to calculate BMI.  Physical Exam       Gen: Pleasant female not in acute distress  HEENT: Eyes no erythema of the bulbar or palpebral conjunctiva, no edema.   Psych: Alert and appropriate for age    Impression report and plan:  1.  Chronic urticaria and angioedema    I suspect this was an exacerbation of underlying urticaria and angioedema.  If this were to happen again, we talked about using Zyrtec.  She can use up to 2 tabs twice daily of Zyrtec or Claritin.  She will notify me if symptoms not well controlled.  Reviewed exacerbating factors for chronic urticaria and angioedema.  She does have a current epinephrine device and she will continue to carry this given her history of anaphylaxis.

## 2022-10-05 ENCOUNTER — E-VISIT (OUTPATIENT)
Dept: URGENT CARE | Facility: CLINIC | Age: 42
End: 2022-10-05
Payer: COMMERCIAL

## 2022-10-05 DIAGNOSIS — R21 RASH AND NONSPECIFIC SKIN ERUPTION: Primary | ICD-10-CM

## 2022-10-05 PROCEDURE — 99421 OL DIG E/M SVC 5-10 MIN: CPT | Performed by: EMERGENCY MEDICINE

## 2022-10-05 RX ORDER — BETAMETHASONE DIPROPIONATE 0.5 MG/G
CREAM TOPICAL 2 TIMES DAILY
Qty: 15 G | Refills: 0 | Status: SHIPPED | OUTPATIENT
Start: 2022-10-05 | End: 2023-02-09

## 2022-10-05 NOTE — PATIENT INSTRUCTIONS
Dear Jeimy Lewis    Difficult to tell by this 1 small picture.  I have ordered prescription strength steroid cream.  Please have the area looked at in 7 to 10 days if no better, sooner if worse.    Thanks for choosing us as your health care partner,    Jonathon Cabral MD

## 2023-01-12 ENCOUNTER — ANCILLARY PROCEDURE (OUTPATIENT)
Dept: MAMMOGRAPHY | Facility: CLINIC | Age: 43
End: 2023-01-12
Attending: FAMILY MEDICINE
Payer: COMMERCIAL

## 2023-01-12 DIAGNOSIS — Z12.31 VISIT FOR SCREENING MAMMOGRAM: ICD-10-CM

## 2023-01-12 PROCEDURE — 77067 SCR MAMMO BI INCL CAD: CPT

## 2023-02-02 ENCOUNTER — MYC MEDICAL ADVICE (OUTPATIENT)
Dept: FAMILY MEDICINE | Facility: CLINIC | Age: 43
End: 2023-02-02
Payer: COMMERCIAL

## 2023-02-02 ASSESSMENT — ENCOUNTER SYMPTOMS
NAUSEA: 0
HEADACHES: 0
DIARRHEA: 0
SORE THROAT: 0
CHILLS: 0
JOINT SWELLING: 0
ARTHRALGIAS: 1
HEARTBURN: 0
DYSURIA: 0
BREAST MASS: 0
HEMATURIA: 0
WEAKNESS: 0
NERVOUS/ANXIOUS: 0
SHORTNESS OF BREATH: 0
ABDOMINAL PAIN: 0
COUGH: 0
HEMATOCHEZIA: 0
MYALGIAS: 1
PALPITATIONS: 0
FREQUENCY: 0
PARESTHESIAS: 0
DIZZINESS: 0
EYE PAIN: 0
FEVER: 0
CONSTIPATION: 0

## 2023-02-09 ENCOUNTER — OFFICE VISIT (OUTPATIENT)
Dept: FAMILY MEDICINE | Facility: CLINIC | Age: 43
End: 2023-02-09
Payer: COMMERCIAL

## 2023-02-09 ENCOUNTER — ANCILLARY PROCEDURE (OUTPATIENT)
Dept: GENERAL RADIOLOGY | Facility: CLINIC | Age: 43
End: 2023-02-09
Attending: FAMILY MEDICINE
Payer: COMMERCIAL

## 2023-02-09 VITALS
WEIGHT: 204 LBS | BODY MASS INDEX: 33.99 KG/M2 | SYSTOLIC BLOOD PRESSURE: 115 MMHG | HEART RATE: 91 BPM | OXYGEN SATURATION: 98 % | HEIGHT: 65 IN | DIASTOLIC BLOOD PRESSURE: 75 MMHG

## 2023-02-09 DIAGNOSIS — G57.01 PIRIFORMIS SYNDROME, RIGHT: ICD-10-CM

## 2023-02-09 DIAGNOSIS — Z00.00 ROUTINE GENERAL MEDICAL EXAMINATION AT A HEALTH CARE FACILITY: Primary | ICD-10-CM

## 2023-02-09 DIAGNOSIS — Z13.220 LIPID SCREENING: ICD-10-CM

## 2023-02-09 DIAGNOSIS — G89.29 CHRONIC RIGHT-SIDED LOW BACK PAIN WITH RIGHT-SIDED SCIATICA: ICD-10-CM

## 2023-02-09 DIAGNOSIS — M54.41 CHRONIC RIGHT-SIDED LOW BACK PAIN WITH RIGHT-SIDED SCIATICA: ICD-10-CM

## 2023-02-09 PROBLEM — J30.9 ALLERGIC RHINITIS: Status: ACTIVE | Noted: 2020-07-08

## 2023-02-09 PROBLEM — L50.9 URTICARIA: Status: RESOLVED | Noted: 2022-02-01 | Resolved: 2023-02-09

## 2023-02-09 PROBLEM — M77.30 CALCANEAL SPUR: Status: RESOLVED | Noted: 2020-07-08 | Resolved: 2023-02-09

## 2023-02-09 PROBLEM — Z78.9 ALLERGY HISTORY UNKNOWN: Status: ACTIVE | Noted: 2023-02-09

## 2023-02-09 PROBLEM — M79.671 PAIN OF RIGHT HEEL: Status: RESOLVED | Noted: 2020-07-08 | Resolved: 2023-02-09

## 2023-02-09 PROBLEM — Z80.3 FAMILY HISTORY OF BREAST CANCER IN MOTHER: Status: RESOLVED | Noted: 2022-02-01 | Resolved: 2023-02-09

## 2023-02-09 LAB
ALBUMIN SERPL BCG-MCNC: 4.3 G/DL (ref 3.5–5.2)
ALP SERPL-CCNC: 60 U/L (ref 35–104)
ALT SERPL W P-5'-P-CCNC: 20 U/L (ref 10–35)
ANION GAP SERPL CALCULATED.3IONS-SCNC: 13 MMOL/L (ref 7–15)
AST SERPL W P-5'-P-CCNC: 21 U/L (ref 10–35)
BILIRUB SERPL-MCNC: 0.6 MG/DL
BUN SERPL-MCNC: 10.6 MG/DL (ref 6–20)
CALCIUM SERPL-MCNC: 9.7 MG/DL (ref 8.6–10)
CHLORIDE SERPL-SCNC: 105 MMOL/L (ref 98–107)
CHOLEST SERPL-MCNC: 203 MG/DL
CREAT SERPL-MCNC: 0.88 MG/DL (ref 0.51–0.95)
DEPRECATED HCO3 PLAS-SCNC: 23 MMOL/L (ref 22–29)
ERYTHROCYTE [DISTWIDTH] IN BLOOD BY AUTOMATED COUNT: 13 % (ref 10–15)
GFR SERPL CREATININE-BSD FRML MDRD: 84 ML/MIN/1.73M2
GLUCOSE SERPL-MCNC: 93 MG/DL (ref 70–99)
HCT VFR BLD AUTO: 46.6 % (ref 35–47)
HDLC SERPL-MCNC: 71 MG/DL
HGB BLD-MCNC: 15.6 G/DL (ref 11.7–15.7)
LDLC SERPL CALC-MCNC: 110 MG/DL
MCH RBC QN AUTO: 29.7 PG (ref 26.5–33)
MCHC RBC AUTO-ENTMCNC: 33.5 G/DL (ref 31.5–36.5)
MCV RBC AUTO: 89 FL (ref 78–100)
NONHDLC SERPL-MCNC: 132 MG/DL
PLATELET # BLD AUTO: 251 10E3/UL (ref 150–450)
POTASSIUM SERPL-SCNC: 4 MMOL/L (ref 3.4–5.3)
PROT SERPL-MCNC: 7.2 G/DL (ref 6.4–8.3)
RBC # BLD AUTO: 5.25 10E6/UL (ref 3.8–5.2)
SODIUM SERPL-SCNC: 141 MMOL/L (ref 136–145)
TRIGL SERPL-MCNC: 112 MG/DL
WBC # BLD AUTO: 7.3 10E3/UL (ref 4–11)

## 2023-02-09 PROCEDURE — 99396 PREV VISIT EST AGE 40-64: CPT | Performed by: FAMILY MEDICINE

## 2023-02-09 PROCEDURE — 85027 COMPLETE CBC AUTOMATED: CPT | Performed by: FAMILY MEDICINE

## 2023-02-09 PROCEDURE — 36415 COLL VENOUS BLD VENIPUNCTURE: CPT | Performed by: FAMILY MEDICINE

## 2023-02-09 PROCEDURE — 72100 X-RAY EXAM L-S SPINE 2/3 VWS: CPT | Mod: TC | Performed by: RADIOLOGY

## 2023-02-09 PROCEDURE — 80061 LIPID PANEL: CPT | Performed by: FAMILY MEDICINE

## 2023-02-09 PROCEDURE — 80053 COMPREHEN METABOLIC PANEL: CPT | Performed by: FAMILY MEDICINE

## 2023-02-09 PROCEDURE — 99213 OFFICE O/P EST LOW 20 MIN: CPT | Mod: 25 | Performed by: FAMILY MEDICINE

## 2023-02-09 ASSESSMENT — ENCOUNTER SYMPTOMS
PARESTHESIAS: 0
WEAKNESS: 0
HEARTBURN: 0
BREAST MASS: 0
SHORTNESS OF BREATH: 0
CHILLS: 0
COUGH: 0
DIZZINESS: 0
FREQUENCY: 0
HEMATURIA: 0
FEVER: 0
DYSURIA: 0
ABDOMINAL PAIN: 0
NERVOUS/ANXIOUS: 0
EYE PAIN: 0
NAUSEA: 0
CONSTIPATION: 0
JOINT SWELLING: 0
PALPITATIONS: 0
ARTHRALGIAS: 1
MYALGIAS: 1
DIARRHEA: 0
HEMATOCHEZIA: 0
SORE THROAT: 0
HEADACHES: 0

## 2023-02-09 NOTE — PROGRESS NOTES
SUBJECTIVE:   CC: Jeimy is an 42 year old who presents for preventive health visit.   Patient has been advised of split billing requirements and indicates understanding: Yes  Healthy Habits:     Getting at least 3 servings of Calcium per day:  NO    Bi-annual eye exam:  Yes    Dental care twice a year:  NO    Sleep apnea or symptoms of sleep apnea:  None    Diet:  Regular (no restrictions)    Frequency of exercise:  None    Taking medications regularly:  Yes    Medication side effects:  Not applicable    PHQ-2 Total Score: 2    Additional concerns today:  Yes    Right low back/sciatic pain since the birth of her 15 yo son. Gets better with physical therapy for a couple months then regresses.  Never had imaging and may be interested in this.  Tends to be worse in the mornings and before bed.  Denies radiation past the knee.  No weakness.     has been having elevated blood pressures.  They both plan to join the gym to get more active.  She has been trying to modify her diet and reduce alcohol consumption.    Today's PHQ-2 Score:   PHQ-2 ( 1999 [employer]) 2/2/2023   Q1: Little interest or pleasure in doing things 1   Q2: Feeling down, depressed or hopeless 1   PHQ-2 Score 2   PHQ-2 Total Score (12-17 Years)- Positive if 3 or more points; Administer PHQ-A if positive -   Q1: Little interest or pleasure in doing things Several days   Q2: Feeling down, depressed or hopeless Several days   PHQ-2 Score 2           Social History     Tobacco Use     Smoking status: Never     Smokeless tobacco: Never   Substance Use Topics     Alcohol use: Yes     Comment: I have a few glasses of wine or beer on the weekend.     If you drink alcohol do you typically have >3 drinks per day or >7 drinks per week? Yes    AUDIT - Alcohol Use Disorders Identification Test - Reproduced from the World Health Organization Audit 2001 (Second Edition) 2/2/2023   1.  How often do you have a drink containing alcohol? 2 to 3 times a week   2.  How  many drinks containing alcohol do you have on a typical day when you are drinking? 3 or 4   3.  How often do you have five or more drinks on one occasion? Monthly   4.  How often during the last year have you found that you were not able to stop drinking once you had started? Monthly   5.  How often during the last year have you failed to do what was normally expected of you because of drinking? Never   6.  How often during the last year have you needed a first drink in the morning to get yourself going after a heavy drinking session? Never   7.  How often during the last year have you had a feeling of guilt or remorse after drinking? Weekly   8.  How often during the last year have you been unable to remember what happened the night before because of your drinking? Monthly   9.  Have you or someone else been injured because of your drinking? No   10. Has a relative, friend, doctor or other health care worker been concerned about your drinking or suggested you cut down? No   TOTAL SCORE 13       Reviewed orders with patient.  Reviewed health maintenance and updated orders accordingly - Yes  Lab work is in process  Labs reviewed in EPIC    Breast Cancer Screening:    FHS-7:   Breast CA Risk Assessment (FHS-7) 1/4/2022 1/27/2022 2/2/2023   Did any of your first-degree relatives have breast or ovarian cancer? Yes Yes Yes   Did any of your relatives have bilateral breast cancer? No No No   Did any man in your family have breast cancer? No No No   Did any woman in your family have breast and ovarian cancer? No No No   Did any woman in your family have breast cancer before age 50 y? No No No   Do you have 2 or more relatives with breast and/or ovarian cancer? No No No   Do you have 2 or more relatives with breast and/or bowel cancer? No No No     click delete button to remove this line now  Mammogram Screening - Offered annual screening and updated Health Maintenance for mutual plan based on risk factor  "consideration    Pertinent mammograms are reviewed under the imaging tab.    History of abnormal Pap smear: NO - age 30-65 PAP every 5 years with negative HPV co-testing recommended  PAP / HPV Latest Ref Rng & Units 2/15/2021   PAP Negative for squamous intraepithelial lesion or malignancy. Negative for squamous intraepithelial lesion or malignancy  Electronically signed by Jillian Lewis CT (ASCP) on 2/22/2021 at  3:01 PM     HPV16 NEG Negative   HPV18 NEG Negative   HRHPV NEG Negative     Reviewed and updated as needed this visit by clinical staff   Tobacco  Allergies  Meds  Problems  Med Hx  Surg Hx  Fam Hx          Reviewed and updated as needed this visit by Provider   Tobacco  Allergies  Meds  Problems  Med Hx  Surg Hx  Fam Hx             Review of Systems   Constitutional: Negative for chills and fever.   HENT: Positive for ear pain and hearing loss. Negative for congestion and sore throat.    Eyes: Negative for pain and visual disturbance.   Respiratory: Negative for cough and shortness of breath.    Cardiovascular: Negative for chest pain, palpitations and peripheral edema.   Gastrointestinal: Negative for abdominal pain, constipation, diarrhea, heartburn, hematochezia and nausea.   Breasts:  Negative for tenderness, breast mass and discharge.   Genitourinary: Negative for dysuria, frequency, genital sores, hematuria, pelvic pain, urgency, vaginal bleeding and vaginal discharge.   Musculoskeletal: Positive for arthralgias and myalgias. Negative for joint swelling.   Skin: Negative for rash.   Neurological: Negative for dizziness, weakness, headaches and paresthesias.   Psychiatric/Behavioral: Negative for mood changes. The patient is not nervous/anxious.         OBJECTIVE:   /75   Pulse 91   Ht 1.651 m (5' 5\")   Wt 92.5 kg (204 lb)   SpO2 98%   BMI 33.95 kg/m    Physical Exam  GENERAL: healthy, alert and no distress  EYES: Eyes grossly normal to inspection, PERRL and " conjunctivae and sclerae normal  HENT: ear canals and TM's normal, nose and mouth without ulcers or lesions  NECK: no adenopathy, no asymmetry, masses, or scars and thyroid normal to palpation  RESP: lungs clear to auscultation - no rales, rhonchi or wheezes  CV: regular rate and rhythm, normal S1 S2, no S3 or S4, no murmur, click or rub, no peripheral edema and peripheral pulses strong  ABDOMEN: soft, nontender, no hepatosplenomegaly, no masses and bowel sounds normal  Back: No excessive lordosises or kyphosis.  Pelvic tilt inferior on the right.  Tender to palpation right SI joint and right piriformis muscle. Sensation grossly intact in bilateral lower extremities.  Normal lower extremity strength and reflexes.  SKIN: no suspicious lesions or rashes  NEURO: Normal strength and tone, mentation intact and speech normal  PSYCH: mentation appears normal, affect normal/bright    Diagnostic Test Results:  Labs reviewed in Epic    ASSESSMENT/PLAN:   Jeimy was seen today for physical and hip pain.    Diagnoses and all orders for this visit:    Routine general medical examination at a health care facility    COUNSELING:  Reviewed preventive health counseling, as reflected in patient instructions    She reports that she has never smoked. She has never used smokeless tobacco.  -     Comprehensive metabolic panel (BMP + Alb, Alk Phos, ALT, AST, Total. Bili, TP); Future  -     CBC with platelets; Future    Lipid screening  -     Lipid Profile (Chol, Trig, HDL, LDL calc); Future    Chronic right-sided low back pain with right-sided sciatica  Piriformis syndrome, right  Chronic right low back pain with radiation into posterior lateral hip.  Given 14-year chronicity despite multiple rounds of physical therapy, will assess lumbar spine with x-ray today.  Will refer to spine center for alternative treatment options.  Reviewed importance of weight loss, continuing PT home exercises, and piriformis stretching.  -     Spine   Referral; Future  -     XR Lumbar Spine 2/3 Views; Future      Patient has been advised of split billing requirements and indicates understanding: Yes    Meagan Cerda DO  [facility]

## 2023-02-09 NOTE — PATIENT INSTRUCTIONS
Vitamin D2/3 1000 international unit(s) daily         Preventive Health Recommendations  Female Ages 40 to 49    Yearly exam:   See your health care provider every year in order to  Review health changes.   Discuss preventive care.    Review your medicines if your doctor prescribed any.    Get a Pap test every three years (unless you have an abnormal result and your provider advises testing more often).    If you get Pap tests with HPV test, you only need to test every 5 years, unless you have an abnormal result. You do not need a Pap test if your uterus was removed (hysterectomy) and you have not had cancer.    You should be tested each year for STDs (sexually transmitted diseases), if you're at risk.   Ask your doctor if you should have a mammogram.    Have a colonoscopy (test for colon cancer) if someone in your family has had colon cancer or polyps before age 50.     Have a cholesterol test every 5 years.     Have a diabetes test (fasting glucose) after age 45. If you are at risk for diabetes, you should have this test every 3 years.    Shots: Get a flu shot each year. Get a tetanus shot every 10 years.     Nutrition:   Eat at least 5 servings of fruits and vegetables each day.  Eat whole-grain bread, whole-wheat pasta and brown rice instead of white grains and rice.  Get adequate Calcium and Vitamin D.      Lifestyle  Exercise at least 150 minutes a week (an average of 30 minutes a day, 5 days a week). This will help you control your weight and prevent disease.  Limit alcohol to one drink per day.  No smoking.   Wear sunscreen to prevent skin cancer.  See your dentist every six months for an exam and cleaning.

## 2023-04-06 ENCOUNTER — OFFICE VISIT (OUTPATIENT)
Dept: PALLIATIVE MEDICINE | Facility: OTHER | Age: 43
End: 2023-04-06
Attending: FAMILY MEDICINE
Payer: COMMERCIAL

## 2023-04-06 VITALS — OXYGEN SATURATION: 96 % | SYSTOLIC BLOOD PRESSURE: 118 MMHG | DIASTOLIC BLOOD PRESSURE: 76 MMHG | HEART RATE: 87 BPM

## 2023-04-06 DIAGNOSIS — G57.01 PIRIFORMIS SYNDROME, RIGHT: Primary | ICD-10-CM

## 2023-04-06 DIAGNOSIS — M70.61 GREATER TROCHANTERIC BURSITIS OF RIGHT HIP: ICD-10-CM

## 2023-04-06 DIAGNOSIS — G89.29 CHRONIC RIGHT-SIDED LOW BACK PAIN WITH RIGHT-SIDED SCIATICA: ICD-10-CM

## 2023-04-06 DIAGNOSIS — M54.41 CHRONIC RIGHT-SIDED LOW BACK PAIN WITH RIGHT-SIDED SCIATICA: ICD-10-CM

## 2023-04-06 PROCEDURE — G0463 HOSPITAL OUTPT CLINIC VISIT: HCPCS | Performed by: STUDENT IN AN ORGANIZED HEALTH CARE EDUCATION/TRAINING PROGRAM

## 2023-04-06 PROCEDURE — 99203 OFFICE O/P NEW LOW 30 MIN: CPT | Performed by: STUDENT IN AN ORGANIZED HEALTH CARE EDUCATION/TRAINING PROGRAM

## 2023-04-06 PROCEDURE — G0463 HOSPITAL OUTPT CLINIC VISIT: HCPCS

## 2023-04-06 ASSESSMENT — PAIN SCALES - GENERAL: PAINLEVEL: MODERATE PAIN (4)

## 2023-04-06 NOTE — PATIENT INSTRUCTIONS
Procedures: Right piriformis injection  Physical Therapy: Ordered to work on R piriformis muscle and right greater trochanteric bursitis  Diagnostic Studies: Lumbar XR reviewed  Medication Management:   Ok to continue ibuprofen and tylenol sparingly as needed  Follow up: 3 months    Nikita Castaneda MD  Interventional Pain Medicine  [facility]    Clinic Number:  [phone]  Call with any questions about your care and for scheduling assistance.   Calls are returned Monday through Friday between 8 AM and 4:30 PM. We usually get back to you within 2 business days depending on the issue/request.    If we are prescribing your medications:  For opioid medication refills, call the clinic or send a [employer] message 7 days in advance.  Please include:  Name of requested medication  Name of the pharmacy.  For non-opioid medications, call your pharmacy directly to request a refill. Please allow 3-4 days to be processed.   Per [place] State Law:  All controlled substance prescriptions must be filled within 30 days of being written.    For those controlled substances allowing refills, pickup must occur within 30 days of last fill.      We believe regular attendance is key to your success in our program!    Any time you are unable to keep your appointment we ask that you call us at least 24 hours in advance to cancel.This will allow us to offer the appointment time to another patient.   Multiple missed appointments may lead to dismissal from the clinic.

## 2023-04-06 NOTE — PROGRESS NOTES
[facility] Interventional Evaluation    Date of visit: 4/6/2023    Reason for consultation:    Jeimy Lewis is a 42 year old female who is seen in consultation today for evaluation of an interventional strategy for pain management.    PCP is Meagan Cerda.    Please see the [facility] health questionnaire which the patient completed and reviewed with me in detail.    Chief Complaint:    Chief Complaint   Patient presents with     Pain     Right sided low back pain and hip.        Pain history:  Jeimy Lewis is a 42 year old female presenting with a chief pain complaint of right sided low back pain    Onset: 14 years ago - had first child then  Location and Radiation: right upper buttock with radiation down lateral thigh stopping above the knee  Provoking: sitting and leaning away  Palliating: stretching, laying flat on stomach  Quality: dull and achy, occasionally sharp  Severity: 1-7/10  Timing: constant  Numbness: none  Weakness: none    Patient denies red flag symptoms of corresponding bowel/bladder symptoms, fever/chills, saddle anesthesia, profound motor loss, weight loss, or sudden unremitting increase in pain.    Current pain medications include:  Ibuprofen - helps somewhat  Tylenol - helps somewhat    Previous medication treatments included:  Naproxen - helps somewhat    Other treatments have included:  Jeimy Lewis has not been seen at a pain clinic in the past.   PT: helps but doesn't keep up with HEP and the pain returns - last went 3 years ago  Injections: none  Surgery: none  Alternative: chiro - short term    Past Medical History:  No past medical history on file.  Patient Active Problem List    Diagnosis Date Noted     Allergy history unknown 02/09/2023     Priority: Medium     IUD (intrauterine device) in place 2/1/22 Mirena [id number] 07/08/2020     Priority: Medium     Allergic rhinitis 07/08/2020     Priority: Medium     History  of colonic polyps-repeat 2016     Priority: Medium       Past Surgical History:  Past Surgical History:   Procedure Laterality Date     APPENDECTOMY  1988     BIOPSY  1996ish?    cyst removed behind left ear     COLONOSCOPY  3/8/2021    normal     ENT SURGERY  2002    septoplasty     Medications:  Current Outpatient Medications   Medication Sig Dispense Refill     levonorgestrel (MIRENA) 20 mcg/24 hr (5 years) IUD [LEVONORGESTREL (MIRENA) 20 MCG/24 HR (5 YEARS) IUD] 1 each by Intrauterine route once.       loratadine (CLARITIN) 10 mg tablet Take 10 mg by mouth daily 20mg in am and 20mg in pm       multivitamin therapeutic tablet [MULTIVITAMIN THERAPEUTIC TABLET] Take 1 tablet by mouth daily.       EPINEPHrine (EPIPEN/ADRENACLICK/AUVI-Q) 0.3 mg/0.3 mL injection [EPINEPHRINE (EPIPEN/ADRENACLICK/AUVI-Q) 0.3 MG/0.3 ML INJECTION] Inject 0.3 mL (0.3 mg total) as directed as needed for anaphylaxis. Inject into thigh. (Patient not taking: Reported on 2023) 4 0     Allergies:     Allergies   Allergen Reactions     Alternaria Alternata Anaphylaxis, Diarrhea, GI Disturbance, Hives, Itching, Other (See Comments) and Swelling     Marshelder/Poverty (Weed) Difficulty breathing and Itching     Short Ragweed Pollen Ext Cough, Difficulty breathing and Itching       Family history:  Family History   Problem Relation Age of Onset     Breast Cancer Mother         no testing.      Colon Cancer Father               Colon Polyps Brother      Diabetes Paternal Grandfather         Type 2       Physical Exam:  Vitals:    23 0759   BP: 118/76   Pulse: 87   SpO2: 96%     Exam:  Constitutional: Well developed, NAD  Head: normocephalic. Atraumatic.   Eyes: no redness or jaundice noted   CV: warm, well perfused extremities   Skin: no suspicious lesions or rashes   Psychiatric: mentation appears normal and affect full    Neuromuscular Examination:  Normal ROM in lumbar flexion, extension  Nontender to palpation of  the midline lumbar spine or lumbar paraspinals bilaterally  Negative SI joint tenderness on left, positive on right  ++R piriformis TTP, negative on left  +R greater trochanter TTP, negative on left  Normal 5/5 strength in BLE  Normal sensation to light touch in the L3-S1 distributions bilaterally   No ankle clonus bilaterally    LEÓN: negative bilaterally   FADIR: negative on left, positive on right  Scour: negative bilaterally   Straight leg raise: negative bilaterally     Right SI joint examination  Iliac distraction: negative  Thigh thrust: negative  LEÓN: negative  Sidelying compression: positive  Gaenslen's: negative    Total: 1/5    Reflexes   Patellar: L 2/4, R 2/4      Diagnostic tests:  EXAM: XR LUMBAR SPINE 2/3 VIEWS  LOCATION: [facility]  DATE/TIME: 2/9/2023 9:53 AM     INDICATION: chronic lowback sciatic pain  COMPARISON: None.                                                                      IMPRESSION: Probable hypoplastic T12 ribs. No fracture. Normal vertebral heights and alignment. There is mild L5-S1 disc height loss. Disc heights elsewhere are maintained for age. Normal extraspinal structures. Nonspecific calcifications in the left   paraspinal soft tissues. IUD noted in the pelvis.    Personally reviewed imaging: yes      Assessment:  1. Right piriformis syndrome  2. Right greater trochanteric bursitis  3. Right SI joint dysfunction    Jeimy Lewis is a 42 year old female who presents with 14 years of right-sided low back pain, primarily located at the right upper buttock and right lateral hip.  The pain is typically dull and achy, rated between 1 and 7 out of 10 in severity, and has local radiation down the right lateral thigh but stopping above the knee, not associate any numbness or weakness.  On examination, she has tenderness palpation over the right piriformis muscle primarily, but also significant tenderness over the right greater trochanter and mild  tenderness over the right SI joint at the PSIS.  SI joint provocative maneuvers are primarily negative, but piriformis provocative number such as piriformis stretch are positive.  We will proceed comprehensively with both a right-sided piriformis muscle injection and reinitiation of physical therapy, with a focus on developing home exercise program with the patient can execute daily.  It is okay for her to continue Tylenol and ibuprofen as needed.     Plan:  Diagnosis reviewed, treatment option addressed, and risk/benefits discussed.     1. Procedures: Right piriformis injection  1. Consider right greater trochanteric bursa injection in the future if needed  2. Physical Therapy: Ordered to work on R piriformis muscle and right greater trochanteric bursitis  3. Diagnostic Studies: Lumbar XR reviewed  4. Medication Management:   1. Ok to continue ibuprofen and tylenol sparingly as needed  5. Follow up: 3 months    31 minutes spent on the date of encounter doing chart review, history, and exam documentation and further activities as noted above.     Nikita Castaneda MD  Interventional Pain Medicine  [facility]

## 2023-04-06 NOTE — NURSING NOTE
Patient presents to the clinic today for a follow up with Nikita Castaneda MD  regarding Pain Management.           4/6/2023     7:58 AM   PEG Score   PEG Total Score 5              Meena Wells MA  [facility] [place]

## 2023-04-21 ENCOUNTER — TELEPHONE (OUTPATIENT)
Dept: PALLIATIVE MEDICINE | Facility: OTHER | Age: 43
End: 2023-04-21

## 2023-04-21 DIAGNOSIS — M79.18 MYALGIA, OTHER SITE: Primary | ICD-10-CM

## 2023-04-21 NOTE — CONFIDENTIAL NOTE
Procedure order placed: right piriformis injection    No template red flags  Will need reminder phone call

## 2023-04-24 ENCOUNTER — HOSPITAL ENCOUNTER (OUTPATIENT)
Dept: PHYSICAL THERAPY | Facility: REHABILITATION | Age: 43
Discharge: HOME | End: 2023-04-24
Attending: STUDENT IN AN ORGANIZED HEALTH CARE EDUCATION/TRAINING PROGRAM
Payer: COMMERCIAL

## 2023-04-24 DIAGNOSIS — M25.551 HIP PAIN, RIGHT: Primary | ICD-10-CM

## 2023-04-24 DIAGNOSIS — G57.01 PIRIFORMIS SYNDROME, RIGHT: ICD-10-CM

## 2023-04-24 DIAGNOSIS — M70.61 GREATER TROCHANTERIC BURSITIS OF RIGHT HIP: ICD-10-CM

## 2023-04-24 PROCEDURE — 97161 PT EVAL LOW COMPLEX 20 MIN: CPT | Mod: GP | Performed by: PHYSICAL THERAPIST

## 2023-04-24 PROCEDURE — 97110 THERAPEUTIC EXERCISES: CPT | Mod: GP | Performed by: PHYSICAL THERAPIST

## 2023-04-24 NOTE — PROGRESS NOTES
04/24/23 0800   General Information   Type of Visit Initial OP Ortho PT Evaluation   Start of Care Date 04/24/23   Referring Physician Nikita Castaneda   Patient/Family Goals Statement less hip/low back pain   Orders Evaluate and Treat   Date of Order 04/06/23   Certification Required? No   Medical Diagnosis Piriformis syndrome, right, Greater trochanteric bursitis of right hip   Surgical/Medical history reviewed Yes   Precautions/Limitations no known precautions/limitations   Body Part(s)   Body Part(s) Lumbar Spine/SI;Hip   Presentation and Etiology   Pertinent history of current problem (include personal factors and/or comorbidities that impact the POC) Pt is a 42 year-old woman with chief complaint right low back and hip pain. A lumbar x-ray was fairly normal. She will be getting an injection this week to the right piriformis (then may get one to the greater trochanter bursa). She has had this pain on/off for 15 years - there is always a mild baseline pain, especially when sitting. It can worsen with walking to much (or with sitting too much). No numbness/tingling. Pain gets to mid-thigh. She has done PT for this in the past - she felt the best with manual stretching. Sits for work all day long.   Impairments A. Pain;F. Decreased strength and endurance   Functional Limitations perform activities of daily living;perform required work activities   Symptom Location right low back, lateral hip   How/Where did it occur From insidious onset   Onset date of current episode/exacerbation 04/06/23   Chronicity Chronic   Pain rating (0-10 point scale) Other   Pain rating comment day: 3/10 (can get up to 8/10)   Fall Risk Screen   Fall screen completed by PT   Have you fallen 2 or more times in the past year? No   Have you fallen and had an injury in the past year? No   Is patient a fall risk? No   Abuse Screen (yes response referral indicated)   Feels Unsafe at Home or Work/School no   Feels Threatened by Someone no    Does Anyone Try to Keep You From Having Contact with Others or Doing Things Outside Your Home? no   Physical Signs of Abuse Present no   Patient needs abuse support services and resources No   Hip Objective Findings   Balance/Proprioception (Single Leg Stance) WNL on L side, 4-5 sec on R side   Side (if bilateral, select both right and left) Right;Left   Hip/Knee Strength Comments 30 sec sit<>stand: 12 reps with mild posterior R hip pain at PSIS   Straight Leg Raise Test negative B   LEÓN Test positive on R for restriction and back pain   FADIR Test positive R for hip pain   Palpation tender R gluteus medius, greater trochanter, hip external rotators, gluteus eyal at iliac crest, mildly at ITB and R lumbar paraspinals and quadratus lumborum   Obers/ITB Flexibility mild restriction   Right Piriformis Flexibility mild restriction   Right Hip Flexion PROM WNL with R posterior hip pain   Left Hip Flexion PROM WNL   Right Hip ER PROM WNL - felt good   Left Hip ER PROM WNL   Right Hip IR PROM WNL, R hip pain in groin   Left Hip IR PROM WNL   Right Hip Flexion Strength 5   Left Hip Flexion Strength 5   Right Hip Extension Strength 4+ with pain   Left Hip Extension Strength 5   Right Knee Flexion Strength 5   Left Knee Flexion Strength 5   Right Knee Extension Strength 5   Left Knee Extension Strength 5   Lumbar Spine/SI Objective Findings   Hamstring Flexibility WNL B   Flexion ROM WNL   Extension ROM WNL with increased R sided pain   Right Side Bending ROM WNL with increased R sided pain   Left Side Bending ROM WNL   Hip Adduction Strength 5/5 B   Ankle Dorsiflexion (L4) Strength 5/5 B   Great Toe Extension (L5) Strength 5/5 B   Spring Test WNL lumbar spine, no pain   Patellar Tendon Reflexes  negative sacral thrust and ASIS compression   Planned Therapy Interventions   Planned Therapy Interventions balance training;bed mobility training;joint mobilization;manual therapy;motor coordination training;neuromuscular  re-education;ROM;stretching;strengthening;transfer training   Clinical Impression   Criteria for Skilled Therapeutic Interventions Met yes, treatment indicated   PT Diagnosis Right hip pain   Influenced by the following impairments pain, decreased ROM, decreased strength   Functional limitations due to impairments Difficulty sitting/standing d/t pain   Clinical Presentation Stable/Uncomplicated   Clinical Decision Making (Complexity) Low complexity   Therapy Frequency other (see comments)   Predicted Duration of Therapy Intervention (days/wks) every other week x 3-5 weeks   Risk & Benefits of therapy have been explained Yes   Patient, Family & other staff in agreement with plan of care Yes   Clinical Impression Comments Pt is a 42 year-old woman with chief complaint right low back and hip pain. She demonstrates symptoms arising from R hip musculature with possible contribution from lumbar spine (increased pain with lumbar ext and R side bending); she has tenderness at R gluteus medius, gluteus eyal, greater trochanteric bursa and more mildly at piriformis and lumbar paraspinals. She demonstrates decreased R hip gluteal strength and mild restriction into ROM. She has difficulty with sitting and standing for long periods of time and pain can fluctuate over time. She is appropriate for skilled PT to address impairments, instruct in HEP to reduce pain and improve quality of life.   ORTHO GOALS   PT Ortho Eval Goals 1;2   Ortho Goal 1   Goal Identifier Pain   Goal Description Pt will report R hip pain 1-2/10 on a daily basis without flare ups to 8/10 to improve quality of  life   Target Date 07/03/23   Ortho Goal 2   Goal Identifier HEP   Goal Description Pt will be indep with HEP to manage her pain on her own.   Target Date 07/03/23   Total Evaluation Time   PT Eval, Low Complexity Minutes (56873) 24     Anjel Dodson, PT, DPT, CLT  4/24/2023

## 2023-04-25 ENCOUNTER — TELEPHONE (OUTPATIENT)
Dept: PALLIATIVE MEDICINE | Facility: OTHER | Age: 43
End: 2023-04-25

## 2023-04-25 NOTE — TELEPHONE ENCOUNTER
Called patient to conform appointment for procedure. Reminder she needs a  and where the clinic is located.

## 2023-04-26 ENCOUNTER — RADIOLOGY INJECTION OFFICE VISIT (OUTPATIENT)
Dept: PALLIATIVE MEDICINE | Facility: OTHER | Age: 43
End: 2023-04-26
Payer: COMMERCIAL

## 2023-04-26 VITALS — HEART RATE: 91 BPM | OXYGEN SATURATION: 97 % | DIASTOLIC BLOOD PRESSURE: 77 MMHG | SYSTOLIC BLOOD PRESSURE: 126 MMHG

## 2023-04-26 DIAGNOSIS — M70.61 GREATER TROCHANTERIC BURSITIS OF RIGHT HIP: ICD-10-CM

## 2023-04-26 DIAGNOSIS — G57.01 PIRIFORMIS SYNDROME, RIGHT: ICD-10-CM

## 2023-04-26 DIAGNOSIS — M79.18 MYALGIA, OTHER SITE: ICD-10-CM

## 2023-04-26 PROCEDURE — 20552 NJX 1/MLT TRIGGER POINT 1/2: CPT | Performed by: STUDENT IN AN ORGANIZED HEALTH CARE EDUCATION/TRAINING PROGRAM

## 2023-04-26 PROCEDURE — 96372 THER/PROPH/DIAG INJ SC/IM: CPT | Performed by: STUDENT IN AN ORGANIZED HEALTH CARE EDUCATION/TRAINING PROGRAM

## 2023-04-26 PROCEDURE — 77002 NEEDLE LOCALIZATION BY XRAY: CPT | Mod: 26 | Performed by: STUDENT IN AN ORGANIZED HEALTH CARE EDUCATION/TRAINING PROGRAM

## 2023-04-26 PROCEDURE — 250N000011 HC RX IP 250 OP 636: Performed by: STUDENT IN AN ORGANIZED HEALTH CARE EDUCATION/TRAINING PROGRAM

## 2023-04-26 PROCEDURE — 255N000002 HC RX 255 OP 636: Performed by: STUDENT IN AN ORGANIZED HEALTH CARE EDUCATION/TRAINING PROGRAM

## 2023-04-26 RX ORDER — ROPIVACAINE HYDROCHLORIDE 5 MG/ML
4 INJECTION, SOLUTION EPIDURAL; INFILTRATION; PERINEURAL ONCE
Status: COMPLETED | OUTPATIENT
Start: 2023-04-26 | End: 2023-04-26

## 2023-04-26 RX ORDER — TRIAMCINOLONE ACETONIDE 40 MG/ML
40 INJECTION, SUSPENSION INTRA-ARTICULAR; INTRAMUSCULAR ONCE
Status: COMPLETED | OUTPATIENT
Start: 2023-04-26 | End: 2023-04-26

## 2023-04-26 RX ADMIN — ROPIVACAINE HYDROCHLORIDE 4 ML: 5 INJECTION EPIDURAL; INFILTRATION; PERINEURAL at 11:27

## 2023-04-26 RX ADMIN — IOHEXOL 1 ML: 300 INJECTION, SOLUTION INTRAVENOUS at 11:30

## 2023-04-26 RX ADMIN — TRIAMCINOLONE ACETONIDE 40 MG: 40 INJECTION, SUSPENSION INTRA-ARTICULAR; INTRAMUSCULAR at 11:27

## 2023-04-26 ASSESSMENT — PAIN SCALES - GENERAL
PAINLEVEL: MILD PAIN (2)
PAINLEVEL: MILD PAIN (2)

## 2023-04-26 NOTE — PATIENT INSTRUCTIONS
[facility] [place]   Procedure Discharge Instructions    Today you saw:      Dr. Castaneda    You had an:   piriformis injection     Medications used:  Lidocaine    Omnipaque   Kenalog           If you have received sedation before, during, or after your procedure, for the next 24 hours you shall NOT:   -Drive  -Operate machinery  -Drink alcohol  -Sign any legal documents      Be cautious when walking. Numbness and/or weakness in the lower extremities may occur for up to 6-8 hours after the procedure due to effect of the local anesthetic  Do not drive for 6 hours. The effect of the local anesthetic could slow your reflexes.   You may resume your regular activities after 24 hours  Avoid strenuous activity for the first 24 hours  You may shower, however avoid swimming, tub baths or hot tubs for 24 hours following your procedure  You may have a mild to moderate increase in pain for several days following the injection.  It may take up to 14 days for the steroid medication to start working although you may feel the effect as early as a few days after the procedure.     You may use ice packs for 10-15 minutes, 3 to 4 times a day at the injection site for comfort  Do not use heat to painful areas for 6 to 8 hours. This will give the local anesthetic time to wear off and prevent you from accidentally burning your skin.   Unless you have been directed to avoid the use of anti-inflammatory medications (NSAIDS), you may use medications such as ibuprofen, Aleve or Tylenol for pain control if needed.   If you were fasting, you may resume your normal diet and medications after the procedure  If you have diabetes, check your blood sugar more frequently than usual as your blood sugar may be higher than normal for 10-14 days following a steroid injection. Contact your doctor who manages your diabetes if your blood sugar is higher than usual  Possible side effects of steroids that you may experience  include flushing, elevated blood pressure, increased appetite, mild headaches and restlessness.  All of these symptoms will get better with time.  If you experience any of the following, call the Pain Clinic at [phone]:  -Fever over 100 degree F  -Swelling, bleeding, redness, drainage, warmth at the injection site  -Progressive weakness or numbness in your legs or arms  -Loss of bowel or bladder function  -Unusual headache that is not relieved by Tylenol or other pain reliever  -Unusual new onset of pain that is not improving

## 2023-04-26 NOTE — PROGRESS NOTES
Pre-procedure Intake  If YES to any questions or NO to having a   Please complete laminated checklist and leave on the computer keyboard for Provider, verbally inform provider if able.    For SCS Trial, RFA's or any sedation procedure:  Have you been fasting? NA    If yes, for how long? NA    Are you taking any any blood thinners such as Coumadin, Warfarin, Jantoven, Pradaxa Xarelto, Eliquis, Edoxaban, Enoxaparin, Lovenox, Heparin, Arixtra, Fondaparinux, or Fragmin? OR Antiplatelet medication such as Plavix, Brilinta, or Effient?   No     If yes, when did you take your last dose?     Do you take aspirin?  No    If cervical procedure, have you held aspirin for 6 days?   NA    Do you have any allergies to contrast dye, iodine, steroid and/or numbing medications?  NO    Are you currently taking antibiotics or have an active infection?  NO    Have you had a fever/elevated temperature within the past week? NO    Are you currently taking oral steroids? NO    Do you have a ? Yes    Are you pregnant or breastfeeding?  NO    Have you received the COVID-19 vaccine? Yes    If yes, was it your 1st, 2nd or only dose needed? 2 with booster    Date of most recent vaccine: 11/2/22    Notify provider and RNs if systolic BP >170, diastolic BP >100, P >100 or O2 sats < 90%

## 2023-04-26 NOTE — PROGRESS NOTES
Pre procedure Diagnosis: myofascial pain, piriformis syndrome, sciatica  Post procedure Diagnosis: Same  Procedure performed: right piriformis injection  Anesthesia: none  Complications: none  Operators: Nikita Castaneda MD    Indications:   Jeimy Lewis is a 42 year old female was sent by myself for piriformis syndrome/sciatica.     Options/alternatives, benefits and risks were discussed with the patient including bleeding, infection, tissue trauma, exposure to radiation, reaction to medications including seizure, nerve injury, weakness, and numbness. Questions were answered to her satisfaction and she agrees to proceed. Voluntary informed consent was obtained and signed.     Vitals were reviewed: Yes  Allergies were reviewed:  Yes   Medications were reviewed:  Yes   Pre-procedure pain score: 2/10    Procedure:  After getting informed consent, patient was brought into the procedure suite and was placed in a prone position on the procedure table.   A procedural pause was performed.  Patient was prepped and draped in sterile fashion.     A fluoroscopic view including the SI joint and the superiolateral border of the right acetabulum was obtained.  A location 1.5 cm inferior and 1.5cm lateral from the inferior border of the SI joint was marked. 4 ml of Lidocaine 1% was used to anesthetize the skin. A 25g 3.5 inch spinal needle was advanced towards the marked location. Once coaxial trajectory of the needle was achieved, the needle was advanced in the lateral view until the needle was felt to enter the piriformis musculature by tactile change or lateral imaging.  At this point, 0.5 mL of Omnipaque 300 was injected, with a flow consistent with piriformis muscle spread.    A solution containing 4ml of 0.5% ropivacaine and 40mg of kenalog was injected.  The needle was removed.      Hemostasis was achieved, the area was cleaned, and bandaids were placed when appropriate.  The patient tolerated the procedure well, and was  taken to the recovery room.    Images were saved to PACS.    Post-procedure pain score: see flowsheet/10  Follow-up includes:   -f/u with referring provider    Nikita Castaneda MD  Interventional Pain Medicine  [facility]

## 2023-05-21 NOTE — PATIENT INSTRUCTIONS - HE
Large body habitus with dermatitis to folds. Will monitor for infection.    -Zinc based powder  -Topical abx if indicated     Patient Instructions by Fina Drake MD at 7/8/2020  3:00 PM     Author: Fina Drake MD Service: -- Author Type: Physician    Filed: 7/8/2020  3:44 PM Encounter Date: 7/8/2020 Status: Signed    : Fina Drake MD (Physician)       Patient Education     Understanding Heel Pain    Your heel is the back part of your foot. A band of tissue called the plantar fascia connects the heel bone to the bones in the ball of your foot. Nerves run from the heel up the inside of your ankle and into your leg. When you feel pain in the bottom of your heel, the plantar fascia may be inflamed. Overuse, Achilles tightness, and excess body weight can cause the tissue to tear or pull away from the bone. Sometimes the inflamed plantar fascia also irritates a nerve, causing more pain.  What causes heel pain?  Wearing shoes with poor cushioning can irritate the tissue in your heel (plantar fascia). Being overweight or standing for long periods can also irritate the tissue. Running, walking, tennis, and other sports that put stress on the heels can cause tiny tears in the tissue. If your lower leg muscles are tight, this is more likely to happen. A tight Achilles tendon will also contribute to heel pain.  Symptoms  You may feel pain on the bottom or on the inside edge of your heel. The pain may be sharp when you get out of bed or when you stand up after sitting for a while. You may feel a dull ache in your heel after youve been standing for a long time on a hard surface. Running can also cause a dull ache.  Preventing future problems  To prevent future heel pain, wear shoes with well-cushioned heels. And do exercises prescribed by your healthcare provider to stretch the plantar fascia and the muscles in the lower leg.   Date Last Reviewed: 10/1/2017    8633-0024 [employer]. [address] [place] [address], [place] [address]. All rights reserved. This information is not intended as a substitute for professional  medical care. Always follow your healthcare professional's instructions.

## 2023-12-13 ENCOUNTER — PATIENT OUTREACH (OUTPATIENT)
Dept: GASTROENTEROLOGY | Facility: CLINIC | Age: 43
End: 2023-12-13
Payer: COMMERCIAL

## 2024-01-30 ENCOUNTER — ANCILLARY PROCEDURE (OUTPATIENT)
Dept: MAMMOGRAPHY | Facility: CLINIC | Age: 44
End: 2024-01-30
Attending: FAMILY MEDICINE
Payer: COMMERCIAL

## 2024-01-30 DIAGNOSIS — Z12.31 VISIT FOR SCREENING MAMMOGRAM: ICD-10-CM

## 2024-01-30 PROCEDURE — 77063 BREAST TOMOSYNTHESIS BI: CPT

## 2024-04-27 ENCOUNTER — HEALTH MAINTENANCE LETTER (OUTPATIENT)
Age: 44
End: 2024-04-27

## 2024-05-14 SDOH — HEALTH STABILITY: PHYSICAL HEALTH: ON AVERAGE, HOW MANY MINUTES DO YOU ENGAGE IN EXERCISE AT THIS LEVEL?: 10 MIN

## 2024-05-14 SDOH — HEALTH STABILITY: PHYSICAL HEALTH: ON AVERAGE, HOW MANY DAYS PER WEEK DO YOU ENGAGE IN MODERATE TO STRENUOUS EXERCISE (LIKE A BRISK WALK)?: 3 DAYS

## 2024-05-14 ASSESSMENT — SOCIAL DETERMINANTS OF HEALTH (SDOH): HOW OFTEN DO YOU GET TOGETHER WITH FRIENDS OR RELATIVES?: MORE THAN THREE TIMES A WEEK

## 2024-05-21 ENCOUNTER — OFFICE VISIT (OUTPATIENT)
Dept: FAMILY MEDICINE | Facility: CLINIC | Age: 44
End: 2024-05-21
Payer: COMMERCIAL

## 2024-05-21 VITALS
BODY MASS INDEX: 34.49 KG/M2 | HEIGHT: 65 IN | RESPIRATION RATE: 16 BRPM | TEMPERATURE: 97.9 F | HEART RATE: 81 BPM | OXYGEN SATURATION: 97 % | WEIGHT: 207 LBS | DIASTOLIC BLOOD PRESSURE: 70 MMHG | SYSTOLIC BLOOD PRESSURE: 105 MMHG

## 2024-05-21 DIAGNOSIS — E78.2 MIXED HYPERLIPIDEMIA: ICD-10-CM

## 2024-05-21 DIAGNOSIS — Z00.00 ROUTINE GENERAL MEDICAL EXAMINATION AT A HEALTH CARE FACILITY: Primary | ICD-10-CM

## 2024-05-21 DIAGNOSIS — Z91.09 ALLERGY TO MOLD SPORES: ICD-10-CM

## 2024-05-21 LAB
ALBUMIN SERPL BCG-MCNC: 4 G/DL (ref 3.5–5.2)
ALP SERPL-CCNC: 52 U/L (ref 40–150)
ALT SERPL W P-5'-P-CCNC: 11 U/L (ref 0–50)
ANION GAP SERPL CALCULATED.3IONS-SCNC: 10 MMOL/L (ref 7–15)
AST SERPL W P-5'-P-CCNC: 15 U/L (ref 0–45)
BILIRUB SERPL-MCNC: 0.5 MG/DL
BUN SERPL-MCNC: 12.8 MG/DL (ref 6–20)
CALCIUM SERPL-MCNC: 9.2 MG/DL (ref 8.6–10)
CHLORIDE SERPL-SCNC: 107 MMOL/L (ref 98–107)
CHOLEST SERPL-MCNC: 185 MG/DL
CREAT SERPL-MCNC: 0.81 MG/DL (ref 0.51–0.95)
DEPRECATED HCO3 PLAS-SCNC: 22 MMOL/L (ref 22–29)
EGFRCR SERPLBLD CKD-EPI 2021: >90 ML/MIN/1.73M2
ERYTHROCYTE [DISTWIDTH] IN BLOOD BY AUTOMATED COUNT: 13.3 % (ref 10–15)
FASTING STATUS PATIENT QL REPORTED: YES
FASTING STATUS PATIENT QL REPORTED: YES
GLUCOSE SERPL-MCNC: 92 MG/DL (ref 70–99)
HCT VFR BLD AUTO: 44.4 % (ref 35–47)
HDLC SERPL-MCNC: 64 MG/DL
HGB BLD-MCNC: 14.9 G/DL (ref 11.7–15.7)
LDLC SERPL CALC-MCNC: 107 MG/DL
MCH RBC QN AUTO: 30.2 PG (ref 26.5–33)
MCHC RBC AUTO-ENTMCNC: 33.6 G/DL (ref 31.5–36.5)
MCV RBC AUTO: 90 FL (ref 78–100)
NONHDLC SERPL-MCNC: 121 MG/DL
PLATELET # BLD AUTO: 255 10E3/UL (ref 150–450)
POTASSIUM SERPL-SCNC: 4.1 MMOL/L (ref 3.4–5.3)
PROT SERPL-MCNC: 6.8 G/DL (ref 6.4–8.3)
RBC # BLD AUTO: 4.94 10E6/UL (ref 3.8–5.2)
SODIUM SERPL-SCNC: 139 MMOL/L (ref 135–145)
TRIGL SERPL-MCNC: 72 MG/DL
WBC # BLD AUTO: 6.4 10E3/UL (ref 4–11)

## 2024-05-21 PROCEDURE — 99213 OFFICE O/P EST LOW 20 MIN: CPT | Mod: 25 | Performed by: FAMILY MEDICINE

## 2024-05-21 PROCEDURE — 80061 LIPID PANEL: CPT | Performed by: FAMILY MEDICINE

## 2024-05-21 PROCEDURE — 36415 COLL VENOUS BLD VENIPUNCTURE: CPT | Performed by: FAMILY MEDICINE

## 2024-05-21 PROCEDURE — 99396 PREV VISIT EST AGE 40-64: CPT | Performed by: FAMILY MEDICINE

## 2024-05-21 PROCEDURE — 80053 COMPREHEN METABOLIC PANEL: CPT | Performed by: FAMILY MEDICINE

## 2024-05-21 PROCEDURE — 85027 COMPLETE CBC AUTOMATED: CPT | Performed by: FAMILY MEDICINE

## 2024-05-21 RX ORDER — EPINEPHRINE 0.3 MG/.3ML
0.3 INJECTION SUBCUTANEOUS PRN
Qty: 2 EACH | Refills: 0 | Status: SHIPPED | OUTPATIENT
Start: 2024-05-21

## 2024-05-21 NOTE — PROGRESS NOTES
"Preventive Care Visit  [facility]  Meagan DO Prashant, Family Medicine  May 21, 2024      Assessment & Plan     Routine general medical examination at a health care facility  Counseling  Appropriate preventive services were discussed with this patient, including applicable screening as appropriate for fall prevention, nutrition, physical activity, weight loss and cognition.  Checklist reviewing preventive services available has been given to the patient.  Reviewed patient's diet, addressing concerns and/or questions.   She is at risk for lack of exercise and has been provided with information to increase physical activity for the benefit of her well-being.   The patient was instructed to see the dentist every 6 months.   She is at risk for psychosocial distress and has been provided with information to reduce risk.   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  EpiPen currently , refill provided.  Consider sending to mail order pharmacy if expensive, versus check GoodRx.  - EPINEPHrine (ANY BX GENERIC EQUIV) 0.3 MG/0.3ML injection 2-pack  Dispense: 2 each; Refill: 0    Mixed hyperlipidemia  Reassess fasting labs and determine ASCVD risk.  - Lipid Profile (Chol, Trig, HDL, LDL calc)  - Comprehensive metabolic panel (BMP + Alb, Alk Phos, ALT, AST, Total. Bili, TP)      Patient has been advised of split billing requirements and indicates understanding: Yes      BMI  Estimated body mass index is 34.45 kg/m  as calculated from the following:    Height as of this encounter: 1.651 m (5' 5\").    Weight as of this encounter: 93.9 kg (207 lb).   Weight management plan: Discussed healthy diet and exercise guidelines      Lance Munson is a 43 year old, presenting for the following:  Physical (fasting)        2024     7:25 AM   Additional Questions   Roomed by SOURAV Woo CMA   Accompanied by -        Health Care " Directive  Patient does not have a Health Care Directive or Living Will: Discussed advance care planning with patient; information given to patient to review.    HPI      5/14/2024   General Health   How would you rate your overall physical health? Good   Feel stress (tense, anxious, or unable to sleep) Only a little   (!) STRESS CONCERN      5/14/2024   Nutrition   Three or more servings of calcium each day? (!) NO   Diet: Other   If other, please elaborate: I avoid too much lactose or take Lactaid before eating it.   How many servings of fruit and vegetables per day? (!) 0-1   How many sweetened beverages each day? 0-1         5/14/2024   Exercise   Days per week of moderate/strenous exercise 3 days   Average minutes spent exercising at this level 10 min         5/14/2024   Social Factors   Frequency of gathering with friends or relatives More than three times a week   Worry food won't last until get money to buy more No   Food not last or not have enough money for food? No   Do you have housing?  Yes   Are you worried about losing your housing? No   Lack of transportation? No   Unable to get utilities (heat,electricity)? No         5/14/2024   Dental   Dentist two times every year? (!) NO         5/14/2024   TB Screening   Were you born outside of the [place]? No         Today's PHQ-2 Score:       5/21/2024     7:22 AM   PHQ-2 ( 1999 [employer])   Q1: Little interest or pleasure in doing things 0   Q2: Feeling down, depressed or hopeless 0   PHQ-2 Score 0   Q1: Little interest or pleasure in doing things Not at all   Q2: Feeling down, depressed or hopeless Not at all   PHQ-2 Score 0           5/14/2024   Substance Use   Alcohol more than 3/day or more than 7/wk No   Do you use any other substances recreationally? (!) ALCOHOL     Social History     Tobacco Use    Smoking status: Never    Smokeless tobacco: Never   Substance Use Topics    Alcohol use: Yes     Comment: I have a few glasses of wine or beer on the weekend.     "Drug use: Never           1/30/2024   LAST FHS-7 RESULTS   1st degree relative breast or ovarian cancer Yes   Any relative bilateral breast cancer No   Any male have breast cancer No   Any ONE woman have BOTH breast AND ovarian cancer No   Any woman with breast cancer before 50yrs No   2 or more relatives with breast AND/OR ovarian cancer No   2 or more relatives with breast AND/OR bowel cancer No              5/14/2024   STI Screening   New sexual partner(s) since last STI/HIV test? No     History of abnormal Pap smear: No - age 30- 64 PAP with HPV every 5 years recommended        Latest Ref Rng & Units 2/15/2021    10:25 AM   PAP / HPV   PAP Negative for squamous intraepithelial lesion or malignancy. Negative for squamous intraepithelial lesion or malignancy  Electronically signed by Jillian Lewis CT (ASCP) on 2/22/2021 at  3:01 PM      HPV 16 DNA NEG Negative    HPV 18 DNA NEG Negative    Other HR HPV NEG Negative      ASCVD Risk   The 10-year ASCVD risk score (Nasra SAMUEL, et al., 2019) is: 0.3%    Values used to calculate the score:      Age: 43 years      Sex: Female      Is Non- : No      Diabetic: No      Tobacco smoker: No      Systolic Blood Pressure: 105 mmHg      Is BP treated: No      HDL Cholesterol: 71 mg/dL      Total Cholesterol: 203 mg/dL        5/14/2024   Contraception/Family Planning   Questions about contraception or family planning No        Reviewed and updated as needed this visit by Provider   Tobacco  Allergies  Meds  Problems  Med Hx  Surg Hx  Fam Hx               Objective    Exam  /70   Pulse 81   Temp 97.9  F (36.6  C)   Resp 16   Ht 1.651 m (5' 5\")   Wt 93.9 kg (207 lb)   SpO2 97%   BMI 34.45 kg/m     Estimated body mass index is 34.45 kg/m  as calculated from the following:    Height as of this encounter: 1.651 m (5' 5\").    Weight as of this encounter: 93.9 kg (207 lb).    Physical Exam  GENERAL: alert and no distress  EYES: " Eyes grossly normal to inspection, PERRL and conjunctivae and sclerae normal  HENT: ear canals and TM's normal, nose and mouth without ulcers or lesions  NECK: no adenopathy, no asymmetry, masses, or scars  RESP: lungs clear to auscultation - no rales, rhonchi or wheezes  CV: regular rate and rhythm, normal S1 S2, no S3 or S4, no murmur, click or rub, no peripheral edema  ABDOMEN: soft, nontender  MS: no gross musculoskeletal defects noted, no edema  SKIN: no suspicious lesions or rashes  NEURO: Normal strength and tone, mentation intact and speech normal  PSYCH: mentation appears normal, affect normal/bright        Signed Electronically by: Meagan Cerda DO

## 2024-05-21 NOTE — PATIENT INSTRUCTIONS
"Preventive Care Advice   This is general advice we often give to help people stay healthy. Your care team may have specific advice just for you. Please talk to your care team about your own preventive care needs.  Lifestyle  Exercise at least 150 minutes each week (30 minutes a day, 5 days a week).  Do muscle strengthening activities 2 days a week. These help control your weight and prevent disease.  No smoking.  Wear sunscreen to prevent skin cancer.  Have your home tested for radon every 2 to 5 years. Radon is a colorless, odorless gas that can harm your lungs. To learn more, go to www.health.[facility].mn. and search for \"Radon in Homes.\"  Keep guns unloaded and locked up in a safe place like a safe or gun vault, or, use a gun lock and hide the keys. Always lock away bullets separately. To learn more, visit [place].gov and search for \"safe gun storage.\"  Nutrition  Eat 5 or more servings of fruits and vegetables each day.  Try wheat bread, brown rice and whole grain pasta (instead of white bread, rice, and pasta).  Get enough calcium and vitamin D. Check the label on foods and aim for 100% of the RDA (recommended daily allowance).  Regular exams  Have a dental exam and cleaning every 6 months.  See your health care team every year to talk about:  Any changes in your health.  Any medicines your care team has prescribed.  Preventive care, family planning, and ways to prevent chronic diseases.  Shots (vaccines)   HPV shots (up to age 26), if you've never had them before.  Hepatitis B shots (up to age 59), if you've never had them before.  COVID-19 shot: Get this shot when it's due.  Flu shot: Get a flu shot every year.  Tetanus shot: Get a tetanus shot every 10 years.  Pneumococcal, hepatitis A, and RSV shots: Ask your care team if you need these based on your risk.  Shingles shot (for age 50 and up).  General health tests  Diabetes screening:  Starting at age 35, Get screened for diabetes at least every 3 years.  If " you are younger than age 35, ask your care team if you should be screened for diabetes.  Cholesterol test: At age 39, start having a cholesterol test every 5 years, or more often if advised.  Bone density scan (DEXA): At age 50, ask your care team if you should have this scan for osteoporosis (brittle bones).  Hepatitis C: Get tested at least once in your life.  Abdominal aortic aneurysm screening: Talk to your doctor about having this screening if you:  Have ever smoked; and  Are biologically male; and  Are between the ages of 65 and 75.  STIs (sexually transmitted infections)  Before age 24: Ask your care team if you should be screened for STIs.  After age 24: Get screened for STIs if you're at risk. You are at risk for STIs (including HIV) if:  You are sexually active with more than one person.  You don't use condoms every time.  You or a partner was diagnosed with a sexually transmitted infection.  If you are at risk for HIV, ask about PrEP medicine to prevent HIV.  Get tested for HIV at least once in your life, whether you are at risk for HIV or not.  Cancer screening tests  Cervical cancer screening: If you have a cervix, begin getting regular cervical cancer screening tests at age 21. Most people who have regular screenings with normal results can stop after age 65. Talk about this with your provider.  Breast cancer scan (mammogram): If you've ever had breasts, begin having regular mammograms starting at age 40. This is a scan to check for breast cancer.  Colon cancer screening: It is important to start screening for colon cancer at age 45.  Have a colonoscopy test every 10 years (or more often if you're at risk) Or, ask your provider about stool tests like a FIT test every year or Cologuard test every 3 years.  To learn more about your testing options, visit: www.Privcap/466993.pdf.  For help making a decision, visit: estefany/hy08355.  Prostate cancer screening test: If you have a prostate and are age 55  to 69, ask your provider if you would benefit from a yearly prostate cancer screening test.  Lung cancer screening: If you are a current or former smoker age 50 to 80, ask your care team if ongoing lung cancer screenings are right for you.  For informational purposes only. Not to replace the advice of your health care provider. Copyright   2023 [employer]. All rights reserved. Clinically reviewed by the [facility] Transitions Program. flck.me [id number] - REV 04/24.    Learning About Stress  What is stress?     Stress is your body's response to a hard situation. Your body can have a physical, emotional, or mental response. Stress is a fact of life for most people, and it affects everyone differently. What causes stress for you may not be stressful for someone else.  A lot of things can cause stress. You may feel stress when you go on a job interview, take a test, or run a race. This kind of short-term stress is normal and even useful. It can help you if you need to work hard or react quickly. For example, stress can help you finish an important job on time.  Long-term stress is caused by ongoing stressful situations or events. Examples of long-term stress include long-term health problems, ongoing problems at work, or conflicts in your family. Long-term stress can harm your health.  How does stress affect your health?  When you are stressed, your body responds as though you are in danger. It makes hormones that speed up your heart, make you breathe faster, and give you a burst of energy. This is called the fight-or-flight stress response. If the stress is over quickly, your body goes back to normal and no harm is done.  But if stress happens too often or lasts too long, it can have bad effects. Long-term stress can make you more likely to get sick, and it can make symptoms of some diseases worse. If you tense up when you are stressed, you may develop neck, shoulder, or low back pain. Stress is  linked to high blood pressure and heart disease.  Stress also harms your emotional health. It can make you denton, tense, or depressed. Your relationships may suffer, and you may not do well at work or school.  What can you do to manage stress?  You can try these things to help manage stress:   Do something active. Exercise or activity can help reduce stress. Walking is a great way to get started. Even everyday activities such as housecleaning or yard work can help.  Try yoga or sara chi. These techniques combine exercise and meditation. You may need some training at first to learn them.  Do something you enjoy. For example, listen to music or go to a movie. Practice your hobby or do volunteer work.  Meditate. This can help you relax, because you are not worrying about what happened before or what may happen in the future.  Do guided imagery. Imagine yourself in any setting that helps you feel calm. You can use online videos, books, or a teacher to guide you.  Do breathing exercises. For example:  From a standing position, bend forward from the waist with your knees slightly bent. Let your arms dangle close to the floor.  Breathe in slowly and deeply as you return to a standing position. Roll up slowly and lift your head last.  Hold your breath for just a few seconds in the standing position.  Breathe out slowly and bend forward from the waist.  Let your feelings out. Talk, laugh, cry, and express anger when you need to. Talking with supportive friends or family, a counselor, or a ratna leader about your feelings is a healthy way to relieve stress. Avoid discussing your feelings with people who make you feel worse.  Write. It may help to write about things that are bothering you. This helps you find out how much stress you feel and what is causing it. When you know this, you can find better ways to cope.  What can you do to prevent stress?  You might try some of these things to help prevent stress:  Manage your time.  "This helps you find time to do the things you want and need to do.  Get enough sleep. Your body recovers from the stresses of the day while you are sleeping.  Get support. Your family, friends, and community can make a difference in how you experience stress.  Limit your news feed. Avoid or limit time on social media or news that may make you feel stressed.  Do something active. Exercise or activity can help reduce stress. Walking is a great way to get started.  Where can you learn more?  Go to https://www.[employer].net/patiented  Enter N032 in the search box to learn more about \"Learning About Stress.\"  Current as of: October 24, 2023               Content Version: 14.0    0918-8747 [employer]   Care instructions adapted under license by your healthcare professional. If you have questions about a medical condition or this instruction, always ask your healthcare professional. [employer] disclaims any warranty or liability for your use of this information.      "

## 2025-02-04 ENCOUNTER — ANCILLARY PROCEDURE (OUTPATIENT)
Dept: MAMMOGRAPHY | Facility: CLINIC | Age: 45
End: 2025-02-04
Attending: FAMILY MEDICINE
Payer: COMMERCIAL

## 2025-02-04 DIAGNOSIS — Z12.31 VISIT FOR SCREENING MAMMOGRAM: ICD-10-CM

## 2025-02-04 PROCEDURE — 77063 BREAST TOMOSYNTHESIS BI: CPT

## 2025-02-12 ENCOUNTER — MYC REFILL (OUTPATIENT)
Dept: FAMILY MEDICINE | Facility: CLINIC | Age: 45
End: 2025-02-12
Payer: COMMERCIAL

## 2025-02-12 DIAGNOSIS — Z91.09 ALLERGY TO MOLD SPORES: ICD-10-CM

## 2025-02-12 RX ORDER — EPINEPHRINE 0.3 MG/.3ML
0.3 INJECTION SUBCUTANEOUS PRN
Qty: 2 EACH | Refills: 0 | Status: SHIPPED | OUTPATIENT
Start: 2025-02-12

## 2025-04-03 SDOH — HEALTH STABILITY: PHYSICAL HEALTH: ON AVERAGE, HOW MANY DAYS PER WEEK DO YOU ENGAGE IN MODERATE TO STRENUOUS EXERCISE (LIKE A BRISK WALK)?: 2 DAYS

## 2025-04-03 SDOH — HEALTH STABILITY: PHYSICAL HEALTH: ON AVERAGE, HOW MANY MINUTES DO YOU ENGAGE IN EXERCISE AT THIS LEVEL?: 20 MIN

## 2025-04-03 ASSESSMENT — SOCIAL DETERMINANTS OF HEALTH (SDOH): HOW OFTEN DO YOU GET TOGETHER WITH FRIENDS OR RELATIVES?: PATIENT DECLINED

## 2025-04-08 ENCOUNTER — OFFICE VISIT (OUTPATIENT)
Dept: FAMILY MEDICINE | Facility: CLINIC | Age: 45
End: 2025-04-08
Payer: COMMERCIAL

## 2025-04-08 VITALS
SYSTOLIC BLOOD PRESSURE: 120 MMHG | WEIGHT: 200.2 LBS | HEART RATE: 90 BPM | HEIGHT: 65 IN | DIASTOLIC BLOOD PRESSURE: 78 MMHG | OXYGEN SATURATION: 98 % | BODY MASS INDEX: 33.36 KG/M2 | RESPIRATION RATE: 18 BRPM | TEMPERATURE: 98.1 F

## 2025-04-08 DIAGNOSIS — M72.2 PLANTAR FASCIITIS, LEFT: ICD-10-CM

## 2025-04-08 DIAGNOSIS — J32.0 CHRONIC MAXILLARY SINUSITIS: ICD-10-CM

## 2025-04-08 DIAGNOSIS — Z00.00 ROUTINE GENERAL MEDICAL EXAMINATION AT A HEALTH CARE FACILITY: Primary | ICD-10-CM

## 2025-04-08 DIAGNOSIS — Z13.220 LIPID SCREENING: ICD-10-CM

## 2025-04-08 LAB
ERYTHROCYTE [DISTWIDTH] IN BLOOD BY AUTOMATED COUNT: 12.4 % (ref 10–15)
HCT VFR BLD AUTO: 45.9 % (ref 35–47)
HGB BLD-MCNC: 15.3 G/DL (ref 11.7–15.7)
MCH RBC QN AUTO: 29.9 PG (ref 26.5–33)
MCHC RBC AUTO-ENTMCNC: 33.3 G/DL (ref 31.5–36.5)
MCV RBC AUTO: 90 FL (ref 78–100)
PLATELET # BLD AUTO: 300 10E3/UL (ref 150–450)
RBC # BLD AUTO: 5.11 10E6/UL (ref 3.8–5.2)
WBC # BLD AUTO: 7.5 10E3/UL (ref 4–11)

## 2025-04-08 PROCEDURE — 90715 TDAP VACCINE 7 YRS/> IM: CPT | Performed by: FAMILY MEDICINE

## 2025-04-08 PROCEDURE — 90471 IMMUNIZATION ADMIN: CPT | Performed by: FAMILY MEDICINE

## 2025-04-08 PROCEDURE — 36415 COLL VENOUS BLD VENIPUNCTURE: CPT | Performed by: FAMILY MEDICINE

## 2025-04-08 PROCEDURE — 3078F DIAST BP <80 MM HG: CPT | Performed by: FAMILY MEDICINE

## 2025-04-08 PROCEDURE — 99213 OFFICE O/P EST LOW 20 MIN: CPT | Mod: 25 | Performed by: FAMILY MEDICINE

## 2025-04-08 PROCEDURE — 85027 COMPLETE CBC AUTOMATED: CPT | Performed by: FAMILY MEDICINE

## 2025-04-08 PROCEDURE — 3074F SYST BP LT 130 MM HG: CPT | Performed by: FAMILY MEDICINE

## 2025-04-08 PROCEDURE — 80061 LIPID PANEL: CPT | Performed by: FAMILY MEDICINE

## 2025-04-08 PROCEDURE — G2211 COMPLEX E/M VISIT ADD ON: HCPCS | Performed by: FAMILY MEDICINE

## 2025-04-08 PROCEDURE — 99396 PREV VISIT EST AGE 40-64: CPT | Mod: 25 | Performed by: FAMILY MEDICINE

## 2025-04-08 PROCEDURE — 80053 COMPREHEN METABOLIC PANEL: CPT | Performed by: FAMILY MEDICINE

## 2025-04-08 NOTE — PROGRESS NOTES
"Preventive Care Visit  [facility]  Meagan Cerda , Family Medicine  Apr 8, 2025      Assessment & Plan     Routine general medical examination at a health care facility  BMI  Estimated body mass index is 33.68 kg/m  as calculated from the following:    Height as of this encounter: 1.642 m (5' 4.65\").    Weight as of this encounter: 90.8 kg (200 lb 3.2 oz).   Weight management plan: Discussed healthy diet and exercise guidelines    Counseling  Appropriate preventive services were addressed with this patient via screening, questionnaire, or discussion as appropriate for fall prevention, nutrition, physical activity, social engagement, weight loss and cognition.  Checklist reviewing preventive services available has been given to the patient.  Reviewed patient's diet, addressing concerns and/or questions.   She is at risk for lack of exercise and has been provided with information to increase physical activity for the benefit of her well-being.   The patient was instructed to see the dentist every 6 months.   - CBC with platelets; Future  - Comprehensive metabolic panel (BMP + Alb, Alk Phos, ALT, AST, Total. Bili, TP); Future    Lipid screening  - Lipid panel reflex to direct LDL Fasting; Future    Chronic maxillary sinusitis  Approximately 8 months of nasal congestion and postnasal drip.  Has been treated with antibiotics x 2 since its onset.  History significant for septoplasty in college.  Otherwise afebrile, without signs of acute infection.  Recommend 2 to 4-week trial of daily intranasal fluticasone, reviewed proper administration technique.  Given chronicity will also assess with CT sinus and consider ENT follow-up referral.  - CT Sinus w/o Contrast; Future    Plantar fasciitis, left  Suspect plantar fasciitis given location of pain, medial left heel.  Discussed supportive shoes with arch support, NSAIDs and ice as needed, weight loss, also provided home PT exercises.    Patient " has been advised of split billing requirements and indicates understanding: Yes        The longitudinal plan of care for the diagnosis(es)/condition(s) as documented were addressed during this visit. Due to the added complexity in care, I will continue to support Jeimy in the subsequent management and with ongoing continuity of care.      Subjective   Jeimy is a 44 year old, presenting for the following:  Annual Visit (Fasting - labs, sinus issue going on since September, [facility], was told there was some rattling in the lungs, pain in left heel when standing or walking too much)        4/8/2025     1:16 PM   Additional Questions   Roomed by Paola MUNOZ CMA   Accompanied by self          HPI    September new job, first week bad cold, chronic nasal ongestion since. Pneumonia in ovember. Early march flare again, [facility] given antibiotic. Mild congestion has continued. Septolplasty college. Tried breathing strips, no relief. Flonase August- September for hayfever. Helped some.     Advance Care Planning  Patient does not have a Health Care Directive: Discussed advance care planning with patient; information given to patient to review.      4/3/2025   General Health   How would you rate your overall physical health? (!) FAIR   Feel stress (tense, anxious, or unable to sleep) Only a little   (!) STRESS CONCERN      4/3/2025   Nutrition   Three or more servings of calcium each day? (!) NO   Diet: Other   If other, please elaborate: I don't drink milk and avoid things with a lot of lactose.   How many servings of fruit and vegetables per day? (!) 0-1   How many sweetened beverages each day? 0-1         4/3/2025   Exercise   Days per week of moderate/strenous exercise 2 days   Average minutes spent exercising at this level 20 min   (!) EXERCISE CONCERN      4/3/2025   Social Factors   Frequency of gathering with friends or relatives Patient declined   Worry food won't last until get money to buy more  No   Food not last or not have enough money for food? No   Do you have housing? (Housing is defined as stable permanent housing and does not include staying ouside in a car, in a tent, in an abandoned building, in an overnight shelter, or couch-surfing.) Yes   Are you worried about losing your housing? No   Lack of transportation? No   Unable to get utilities (heat,electricity)? No         4/3/2025   Dental   Dentist two times every year? (!) NO           5/14/2024   TB Screening   Were you born outside of the [place]? No           Today's PHQ-2 Score:       4/8/2025     1:10 PM   PHQ-2 ( 1999 [employer])   Q1: Little interest or pleasure in doing things 0   Q2: Feeling down, depressed or hopeless 0   PHQ-2 Score 0    Q1: Little interest or pleasure in doing things Not at all   Q2: Feeling down, depressed or hopeless Not at all   PHQ-2 Score 0       Patient-reported           4/3/2025   Substance Use   Alcohol more than 3/day or more than 7/wk No   Do you use any other substances recreationally? No     Social History     Tobacco Use     Smoking status: Never     Passive exposure: Never     Smokeless tobacco: Never   Substance Use Topics     Alcohol use: Yes     Comment: I have a few glasses of wine or beer on the weekend.     Drug use: Never           2/4/2025   LAST FHS-7 RESULTS   1st degree relative breast or ovarian cancer Yes   Any relative bilateral breast cancer No   Any male have breast cancer No   Any ONE woman have BOTH breast AND ovarian cancer No   Any woman with breast cancer before 50yrs No   2 or more relatives with breast AND/OR ovarian cancer No   2 or more relatives with breast AND/OR bowel cancer Yes        Mammogram Screening - Mammogram every 1-2 years updated in Health Maintenance based on mutual decision making        4/3/2025   STI Screening   New sexual partner(s) since last STI/HIV test? No     History of abnormal Pap smear: No - age 30-64 HPV with reflex Pap every 5 years recommended        Latest  "Ref Rng & Units 2/15/2021    10:25 AM   PAP / HPV   PAP Negative for squamous intraepithelial lesion or malignancy. Negative for squamous intraepithelial lesion or malignancy  Electronically signed by Jillian Lewis CT (ASCP) on 2/22/2021 at  3:01 PM      HPV 16 DNA NEG Negative    HPV 18 DNA NEG Negative    Other HR HPV NEG Negative      ASCVD Risk   The 10-year ASCVD risk score (Nasra SAMUEL, et al., 2019) is: 0.4%    Values used to calculate the score:      Age: 44 years      Sex: Female      Is Non- : No      Diabetic: No      Tobacco smoker: No      Systolic Blood Pressure: 120 mmHg      Is BP treated: No      HDL Cholesterol: 64 mg/dL      Total Cholesterol: 185 mg/dL        4/3/2025   Contraception/Family Planning   Questions about contraception or family planning No        Reviewed and updated as needed this visit by Provider   Tobacco  Allergies  Meds  Problems  Med Hx  Surg Hx  Fam Hx               Objective    Exam  /78   Pulse 90   Temp 98.1  F (36.7  C) (Oral)   Resp 18   Ht 1.642 m (5' 4.65\")   Wt 90.8 kg (200 lb 3.2 oz)   SpO2 98%   BMI 33.68 kg/m     Estimated body mass index is 33.68 kg/m  as calculated from the following:    Height as of this encounter: 1.642 m (5' 4.65\").    Weight as of this encounter: 90.8 kg (200 lb 3.2 oz).    Physical Exam  GENERAL: alert and no distress  EYES: Eyes grossly normal to inspection, PERRL and conjunctivae and sclerae normal  HENT: ear canals and TM's normal, nose and mouth without ulcers or lesions  NECK: no adenopathy, no asymmetry, masses, or scars  RESP: lungs clear to auscultation - no rales, rhonchi or wheezes, occasional dry cough  CV: regular rate and rhythm, normal S1 S2, no S3 or S4, no murmur, click or rub, no peripheral edema  ABDOMEN: soft, nontender  MS: no gross musculoskeletal defects noted, no edema, mild tenderness to palpation plantar aspect medial right heel  SKIN: no suspicious lesions or " spencer  NEURO: Normal strength and tone, mentation intact and speech normal  PSYCH: mentation appears normal, affect normal/bright        Signed Electronically by: Meagan Cerda DO

## 2025-04-08 NOTE — PATIENT INSTRUCTIONS
Patient Education   Preventive Care Advice   This is general advice given by our system to help you stay healthy. However, your care team may have specific advice just for you. Please talk to your care team about your preventive care needs.  Nutrition  Eat 5 or more servings of fruits and vegetables each day.  Try wheat bread, brown rice and whole grain pasta (instead of white bread, rice, and pasta).  Get enough calcium and vitamin D. Check the label on foods and aim for 100% of the RDA (recommended daily allowance).  Lifestyle  Exercise at least 150 minutes each week  (30 minutes a day, 5 days a week).  Do muscle strengthening activities 2 days a week. These help control your weight and prevent disease.  No smoking.  Wear sunscreen to prevent skin cancer.  Have a dental exam and cleaning every 6 months.  Yearly exams  See your health care team every year to talk about:  Any changes in your health.  Any medicines your care team has prescribed.  Preventive care, family planning, and ways to prevent chronic diseases.  Shots (vaccines)   HPV shots (up to age 26), if you've never had them before.  Hepatitis B shots (up to age 59), if you've never had them before.  COVID-19 shot: Get this shot when it's due.  Flu shot: Get a flu shot every year.  Tetanus shot: Get a tetanus shot every 10 years.  Pneumococcal, hepatitis A, and RSV shots: Ask your care team if you need these based on your risk.  Shingles shot (for age 50 and up)  General health tests  Diabetes screening:  Starting at age 35, Get screened for diabetes at least every 3 years.  If you are younger than age 35, ask your care team if you should be screened for diabetes.  Cholesterol test: At age 39, start having a cholesterol test every 5 years, or more often if advised.  Bone density scan (DEXA): At age 50, ask your care team if you should have this scan for osteoporosis (brittle bones).  Hepatitis C: Get tested at least once in your life.  STIs (sexually  transmitted infections)  Before age 24: Ask your care team if you should be screened for STIs.  After age 24: Get screened for STIs if you're at risk. You are at risk for STIs (including HIV) if:  You are sexually active with more than one person.  You don't use condoms every time.  You or a partner was diagnosed with a sexually transmitted infection.  If you are at risk for HIV, ask about PrEP medicine to prevent HIV.  Get tested for HIV at least once in your life, whether you are at risk for HIV or not.  Cancer screening tests  Cervical cancer screening: If you have a cervix, begin getting regular cervical cancer screening tests starting at age 21.  Breast cancer scan (mammogram): If you've ever had breasts, begin having regular mammograms starting at age 40. This is a scan to check for breast cancer.  Colon cancer screening: It is important to start screening for colon cancer at age 45.  Have a colonoscopy test every 10 years (or more often if you're at risk) Or, ask your provider about stool tests like a FIT test every year or Cologuard test every 3 years.  To learn more about your testing options, visit:   .  For help making a decision, visit:   https://bit.ly/jg09616.  Prostate cancer screening test: If you have a prostate, ask your care team if a prostate cancer screening test (PSA) at age 55 is right for you.  Lung cancer screening: If you are a current or former smoker ages 50 to 80, ask your care team if ongoing lung cancer screenings are right for you.  For informational purposes only. Not to replace the advice of your health care provider. Copyright   2023 [employer]. All rights reserved. Clinically reviewed by the [facility] Transitions Program. [employer] [id number] - REV 01/24.

## 2025-04-09 LAB
ALBUMIN SERPL BCG-MCNC: 4.4 G/DL (ref 3.5–5.2)
ALP SERPL-CCNC: 66 U/L (ref 40–150)
ALT SERPL W P-5'-P-CCNC: 19 U/L (ref 0–50)
ANION GAP SERPL CALCULATED.3IONS-SCNC: 11 MMOL/L (ref 7–15)
AST SERPL W P-5'-P-CCNC: 17 U/L (ref 0–45)
BILIRUB SERPL-MCNC: 0.7 MG/DL
BUN SERPL-MCNC: 11.9 MG/DL (ref 6–20)
CALCIUM SERPL-MCNC: 9.8 MG/DL (ref 8.8–10.4)
CHLORIDE SERPL-SCNC: 104 MMOL/L (ref 98–107)
CHOLEST SERPL-MCNC: 231 MG/DL
CREAT SERPL-MCNC: 0.85 MG/DL (ref 0.51–0.95)
EGFRCR SERPLBLD CKD-EPI 2021: 87 ML/MIN/1.73M2
FASTING STATUS PATIENT QL REPORTED: YES
FASTING STATUS PATIENT QL REPORTED: YES
GLUCOSE SERPL-MCNC: 85 MG/DL (ref 70–99)
HCO3 SERPL-SCNC: 24 MMOL/L (ref 22–29)
HDLC SERPL-MCNC: 86 MG/DL
LDLC SERPL CALC-MCNC: 130 MG/DL
NONHDLC SERPL-MCNC: 145 MG/DL
POTASSIUM SERPL-SCNC: 4.2 MMOL/L (ref 3.4–5.3)
PROT SERPL-MCNC: 7.4 G/DL (ref 6.4–8.3)
SODIUM SERPL-SCNC: 139 MMOL/L (ref 135–145)
TRIGL SERPL-MCNC: 76 MG/DL

## 2025-04-16 ENCOUNTER — HOSPITAL ENCOUNTER (OUTPATIENT)
Dept: CT IMAGING | Facility: HOSPITAL | Age: 45
Discharge: HOME | End: 2025-04-16
Attending: FAMILY MEDICINE
Payer: COMMERCIAL

## 2025-04-16 DIAGNOSIS — J32.0 CHRONIC MAXILLARY SINUSITIS: ICD-10-CM

## 2025-04-16 PROCEDURE — 70486 CT MAXILLOFACIAL W/O DYE: CPT

## 2025-05-12 ENCOUNTER — OFFICE VISIT (OUTPATIENT)
Dept: FAMILY MEDICINE | Facility: CLINIC | Age: 45
End: 2025-05-12
Payer: COMMERCIAL

## 2025-05-12 VITALS
BODY MASS INDEX: 33.72 KG/M2 | HEIGHT: 65 IN | OXYGEN SATURATION: 99 % | DIASTOLIC BLOOD PRESSURE: 80 MMHG | HEART RATE: 87 BPM | RESPIRATION RATE: 16 BRPM | WEIGHT: 202.4 LBS | TEMPERATURE: 98.3 F | SYSTOLIC BLOOD PRESSURE: 100 MMHG
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
## 2025-05-12 DIAGNOSIS — S61.451A CAT BITE OF HAND, RIGHT, INITIAL ENCOUNTER: Primary | ICD-10-CM

## 2025-05-12 PROCEDURE — 3079F DIAST BP 80-89 MM HG: CPT

## 2025-05-12 PROCEDURE — 99213 OFFICE O/P EST LOW 20 MIN: CPT

## 2025-05-12 PROCEDURE — 3074F SYST BP LT 130 MM HG: CPT

## 2025-05-12 NOTE — PROGRESS NOTES
Assessment & Plan    Cat Bite Infection  - Cat bite occurred 1 day ago with erythema now surrounding wound and extending from when originally noted.   - Likely infectious process given nature of cat bite and presentation today  - The treatment plan includes prescribing Augmentin for 7 days, to be taken morning and night.  - Ibuprofen is recommended for pain relief, up to 600 mg three times a day, and applying ice for 10-15 minutes, four times a day to reduce swelling.  - Would consider further evaluation or referral to an infectious disease specialist if the infection does not improve as expected.     - amoxicillin-clavulanate (AUGMENTIN) 875-125 MG tablet  Dispense: 14 tablet; Refill: 0      Please seek immediate medical attention (go to the emergency room or urgent care) if symptoms worser or for concerning changes.    Follow-up if symptoms do not improve as anticipated, as needed for acute concern, and May schedule follow-up with me for cat bite, medication ordered today, resolution of cat bite if unable to see PCP due to scheduling availability     ---------------------------------------------------------------------------------------   Subjective   Jeimy is a 44 year old year old female, presenting for the following health issues:  Chief Complaint   Patient presents with    Cat Bite         5/12/2025     8:05 AM   Additional Questions   Roomed by kika Lewis, a 44-year-old female, presented with a cat bite on her right hand that occurred one day prior to the consultation. She reported that since the incident, there has been increasing redness around the bite area, which has been expanding. Initially, she managed the pain by cleaning the wound with and applying a Band-Aid, which kept the pain manageable for most of the day. However, she noted that the pain intensified the following morning. She described the pain as a 3 out of 10 when at rest, but it increases to a 4 or 5 when she attempts to  "grab something, depending on the pressure applied.        Patient submitted visit information  Answers submitted by the patient for this visit:  Provider Visit on 5/12/2025  8:15 AM with Byron Mansfield  General Concern (Submitted on 5/12/2025)  Chief Complaint: Chronic problems general questions HPI Form  What is the reason for your visit today?: Cat bit on right hand from yesterday  When did your symptoms begin?: Today  What are your symptoms?: Redness, swelling, pain  How would you describe these symptoms?: Moderate  Are your symptoms:: Worsening  Have you had these symptoms before?: No  ---------------------------------------------------------------------------------------   Objective    Vital signs: /80   Pulse 87   Temp 98.3  F (36.8  C) (Oral)   Resp 16   Ht 1.644 m (5' 4.72\")   Wt 91.8 kg (202 lb 6.4 oz)   SpO2 99%   BMI 33.97 kg/m      Body mass index is 33.97 kg/m .    Physical Exam    - Skin: Erythema on the hand, surrounding two puncture wounds.     ---------------------------------------------------------------------------------------    Options for treatment and follow-up care were reviewed with the patient. Jeimy Lewis and/or guardian was engaged and actively involved in the decision making process. Jeimy Lewis and/or guardian verbalized understanding of the options discussed and was satisfied with the final plan.    Patient consented to use of ambient AI scribe prior to initiation of visit.    Signed Electronically by: USMAN Pascual CNP      "